# Patient Record
Sex: FEMALE | Race: WHITE | NOT HISPANIC OR LATINO | Employment: UNEMPLOYED | URBAN - METROPOLITAN AREA
[De-identification: names, ages, dates, MRNs, and addresses within clinical notes are randomized per-mention and may not be internally consistent; named-entity substitution may affect disease eponyms.]

---

## 2023-09-09 ENCOUNTER — HOSPITAL ENCOUNTER (OUTPATIENT)
Dept: DATA CONVERSION | Facility: HOSPITAL | Age: 43
Discharge: HOME | End: 2023-09-09
Payer: COMMERCIAL

## 2023-09-09 DIAGNOSIS — F17.200 NICOTINE DEPENDENCE, UNSPECIFIED, UNCOMPLICATED: ICD-10-CM

## 2023-09-09 DIAGNOSIS — Z88.2 ALLERGY STATUS TO SULFONAMIDES: ICD-10-CM

## 2023-09-09 DIAGNOSIS — R20.2 PARESTHESIA OF SKIN: ICD-10-CM

## 2023-09-09 DIAGNOSIS — R20.0 ANESTHESIA OF SKIN: ICD-10-CM

## 2023-09-26 ENCOUNTER — HOSPITAL ENCOUNTER (OUTPATIENT)
Dept: DATA CONVERSION | Facility: HOSPITAL | Age: 43
Discharge: HOME | End: 2023-09-26
Payer: COMMERCIAL

## 2023-09-26 DIAGNOSIS — F17.200 NICOTINE DEPENDENCE, UNSPECIFIED, UNCOMPLICATED: ICD-10-CM

## 2023-09-26 DIAGNOSIS — G56.01 CARPAL TUNNEL SYNDROME, RIGHT UPPER LIMB: ICD-10-CM

## 2023-09-26 DIAGNOSIS — M79.644 PAIN IN RIGHT FINGER(S): ICD-10-CM

## 2023-09-26 DIAGNOSIS — Z88.2 ALLERGY STATUS TO SULFONAMIDES: ICD-10-CM

## 2023-12-05 ENCOUNTER — APPOINTMENT (OUTPATIENT)
Dept: RADIOLOGY | Facility: HOSPITAL | Age: 43
End: 2023-12-05
Payer: COMMERCIAL

## 2023-12-05 ENCOUNTER — APPOINTMENT (OUTPATIENT)
Dept: CARDIOLOGY | Facility: HOSPITAL | Age: 43
End: 2023-12-05
Payer: COMMERCIAL

## 2023-12-05 ENCOUNTER — HOSPITAL ENCOUNTER (EMERGENCY)
Facility: HOSPITAL | Age: 43
Discharge: HOME | End: 2023-12-05
Attending: EMERGENCY MEDICINE
Payer: COMMERCIAL

## 2023-12-05 VITALS
WEIGHT: 194.1 LBS | BODY MASS INDEX: 35.72 KG/M2 | SYSTOLIC BLOOD PRESSURE: 104 MMHG | HEIGHT: 62 IN | HEART RATE: 97 BPM | OXYGEN SATURATION: 97 % | TEMPERATURE: 97.7 F | DIASTOLIC BLOOD PRESSURE: 71 MMHG | RESPIRATION RATE: 18 BRPM

## 2023-12-05 DIAGNOSIS — J44.1 CHRONIC OBSTRUCTIVE PULMONARY DISEASE WITH ACUTE EXACERBATION (MULTI): ICD-10-CM

## 2023-12-05 DIAGNOSIS — R07.9 CHEST PAIN, UNSPECIFIED TYPE: Primary | ICD-10-CM

## 2023-12-05 DIAGNOSIS — I10 DIASTOLIC HYPERTENSION: ICD-10-CM

## 2023-12-05 LAB
ALBUMIN SERPL-MCNC: 3.7 G/DL (ref 3.5–5)
ALP BLD-CCNC: 52 U/L (ref 35–125)
ALT SERPL-CCNC: 16 U/L (ref 5–40)
ANION GAP SERPL CALC-SCNC: 11 MMOL/L
AST SERPL-CCNC: 15 U/L (ref 5–40)
ATRIAL RATE: 105 BPM
BASOPHILS # BLD AUTO: 0.06 X10*3/UL (ref 0–0.1)
BASOPHILS NFR BLD AUTO: 0.7 %
BILIRUB SERPL-MCNC: 0.2 MG/DL (ref 0.1–1.2)
BUN SERPL-MCNC: 17 MG/DL (ref 8–25)
CALCIUM SERPL-MCNC: 9.3 MG/DL (ref 8.5–10.4)
CHLORIDE SERPL-SCNC: 105 MMOL/L (ref 97–107)
CO2 SERPL-SCNC: 23 MMOL/L (ref 24–31)
CREAT SERPL-MCNC: 0.7 MG/DL (ref 0.4–1.6)
EOSINOPHIL # BLD AUTO: 0.43 X10*3/UL (ref 0–0.7)
EOSINOPHIL NFR BLD AUTO: 4.8 %
ERYTHROCYTE [DISTWIDTH] IN BLOOD BY AUTOMATED COUNT: 13.2 % (ref 11.5–14.5)
FLUAV RNA RESP QL NAA+PROBE: NOT DETECTED
FLUBV RNA RESP QL NAA+PROBE: NOT DETECTED
GFR SERPL CREATININE-BSD FRML MDRD: >90 ML/MIN/1.73M*2
GLUCOSE SERPL-MCNC: 131 MG/DL (ref 65–99)
HCG SERPL-ACNC: <1 MIU/ML
HCT VFR BLD AUTO: 45.3 % (ref 36–46)
HGB BLD-MCNC: 14.9 G/DL (ref 12–16)
IMM GRANULOCYTES # BLD AUTO: 0.06 X10*3/UL (ref 0–0.7)
IMM GRANULOCYTES NFR BLD AUTO: 0.7 % (ref 0–0.9)
LYMPHOCYTES # BLD AUTO: 2.92 X10*3/UL (ref 1.2–4.8)
LYMPHOCYTES NFR BLD AUTO: 32.4 %
MCH RBC QN AUTO: 27.7 PG (ref 26–34)
MCHC RBC AUTO-ENTMCNC: 32.9 G/DL (ref 32–36)
MCV RBC AUTO: 84 FL (ref 80–100)
MONOCYTES # BLD AUTO: 0.49 X10*3/UL (ref 0.1–1)
MONOCYTES NFR BLD AUTO: 5.4 %
NEUTROPHILS # BLD AUTO: 5.05 X10*3/UL (ref 1.2–7.7)
NEUTROPHILS NFR BLD AUTO: 56 %
NRBC BLD-RTO: 0 /100 WBCS (ref 0–0)
NT-PROBNP SERPL-MCNC: <36 PG/ML (ref 0–178)
P AXIS: 79 DEGREES
P OFFSET: 198 MS
P ONSET: 154 MS
PLATELET # BLD AUTO: 262 X10*3/UL (ref 150–450)
POTASSIUM SERPL-SCNC: 4.2 MMOL/L (ref 3.4–5.1)
PR INTERVAL: 134 MS
PROT SERPL-MCNC: 6.7 G/DL (ref 5.9–7.9)
Q ONSET: 221 MS
QRS COUNT: 17 BEATS
QRS DURATION: 76 MS
QT INTERVAL: 348 MS
QTC CALCULATION(BAZETT): 459 MS
QTC FREDERICIA: 419 MS
R AXIS: 74 DEGREES
RBC # BLD AUTO: 5.37 X10*6/UL (ref 4–5.2)
RBC MORPH BLD: NORMAL
SARS-COV-2 RNA RESP QL NAA+PROBE: NOT DETECTED
SODIUM SERPL-SCNC: 139 MMOL/L (ref 133–145)
T AXIS: 69 DEGREES
T OFFSET: 395 MS
TROPONIN T SERPL-MCNC: <6 NG/L
TROPONIN T SERPL-MCNC: <6 NG/L
VENTRICULAR RATE: 105 BPM
WBC # BLD AUTO: 9 X10*3/UL (ref 4.4–11.3)

## 2023-12-05 PROCEDURE — 96375 TX/PRO/DX INJ NEW DRUG ADDON: CPT

## 2023-12-05 PROCEDURE — 2500000004 HC RX 250 GENERAL PHARMACY W/ HCPCS (ALT 636 FOR OP/ED): Performed by: EMERGENCY MEDICINE

## 2023-12-05 PROCEDURE — 96374 THER/PROPH/DIAG INJ IV PUSH: CPT

## 2023-12-05 PROCEDURE — 96361 HYDRATE IV INFUSION ADD-ON: CPT

## 2023-12-05 PROCEDURE — 84702 CHORIONIC GONADOTROPIN TEST: CPT | Performed by: EMERGENCY MEDICINE

## 2023-12-05 PROCEDURE — 84484 ASSAY OF TROPONIN QUANT: CPT | Performed by: EMERGENCY MEDICINE

## 2023-12-05 PROCEDURE — 99284 EMERGENCY DEPT VISIT MOD MDM: CPT | Mod: 25 | Performed by: EMERGENCY MEDICINE

## 2023-12-05 PROCEDURE — 87636 SARSCOV2 & INF A&B AMP PRB: CPT | Performed by: EMERGENCY MEDICINE

## 2023-12-05 PROCEDURE — 80053 COMPREHEN METABOLIC PANEL: CPT | Performed by: EMERGENCY MEDICINE

## 2023-12-05 PROCEDURE — 2500000002 HC RX 250 W HCPCS SELF ADMINISTERED DRUGS (ALT 637 FOR MEDICARE OP, ALT 636 FOR OP/ED): Performed by: EMERGENCY MEDICINE

## 2023-12-05 PROCEDURE — 85025 COMPLETE CBC W/AUTO DIFF WBC: CPT | Performed by: EMERGENCY MEDICINE

## 2023-12-05 PROCEDURE — 71045 X-RAY EXAM CHEST 1 VIEW: CPT

## 2023-12-05 PROCEDURE — 83880 ASSAY OF NATRIURETIC PEPTIDE: CPT | Performed by: EMERGENCY MEDICINE

## 2023-12-05 PROCEDURE — 93005 ELECTROCARDIOGRAM TRACING: CPT

## 2023-12-05 PROCEDURE — 36415 COLL VENOUS BLD VENIPUNCTURE: CPT | Performed by: EMERGENCY MEDICINE

## 2023-12-05 RX ORDER — ALBUTEROL SULFATE 90 UG/1
2 AEROSOL, METERED RESPIRATORY (INHALATION) EVERY 4 HOURS PRN
Qty: 18 G | Refills: 0 | Status: SHIPPED | OUTPATIENT
Start: 2023-12-05 | End: 2024-01-04

## 2023-12-05 RX ORDER — ALBUTEROL SULFATE 90 UG/1
2 AEROSOL, METERED RESPIRATORY (INHALATION) EVERY 4 HOURS PRN
COMMUNITY
End: 2023-12-05

## 2023-12-05 RX ORDER — IPRATROPIUM BROMIDE AND ALBUTEROL SULFATE 2.5; .5 MG/3ML; MG/3ML
3 SOLUTION RESPIRATORY (INHALATION)
Status: DISCONTINUED | OUTPATIENT
Start: 2023-12-05 | End: 2023-12-05

## 2023-12-05 RX ORDER — FAMOTIDINE 10 MG/ML
20 INJECTION INTRAVENOUS ONCE
Status: COMPLETED | OUTPATIENT
Start: 2023-12-05 | End: 2023-12-05

## 2023-12-05 RX ORDER — KETOROLAC TROMETHAMINE 30 MG/ML
15 INJECTION, SOLUTION INTRAMUSCULAR; INTRAVENOUS ONCE
Status: COMPLETED | OUTPATIENT
Start: 2023-12-05 | End: 2023-12-05

## 2023-12-05 RX ORDER — METHYLPREDNISOLONE 4 MG/1
TABLET ORAL
Qty: 21 TABLET | Refills: 0 | Status: SHIPPED | OUTPATIENT
Start: 2023-12-05 | End: 2023-12-12

## 2023-12-05 RX ORDER — IPRATROPIUM BROMIDE AND ALBUTEROL SULFATE 2.5; .5 MG/3ML; MG/3ML
3 SOLUTION RESPIRATORY (INHALATION)
Status: DISCONTINUED | OUTPATIENT
Start: 2023-12-05 | End: 2023-12-05 | Stop reason: HOSPADM

## 2023-12-05 RX ORDER — DICLOFENAC SODIUM 30 MG/G
GEL TOPICAL
COMMUNITY

## 2023-12-05 RX ADMIN — FAMOTIDINE 20 MG: 10 INJECTION, SOLUTION INTRAVENOUS at 08:41

## 2023-12-05 RX ADMIN — KETOROLAC TROMETHAMINE 15 MG: 30 INJECTION, SOLUTION INTRAMUSCULAR at 08:42

## 2023-12-05 RX ADMIN — METHYLPREDNISOLONE SODIUM SUCCINATE 125 MG: 125 INJECTION, POWDER, FOR SOLUTION INTRAMUSCULAR; INTRAVENOUS at 08:42

## 2023-12-05 RX ADMIN — IPRATROPIUM BROMIDE AND ALBUTEROL SULFATE 3 ML: 2.5; .5 SOLUTION RESPIRATORY (INHALATION) at 08:42

## 2023-12-05 RX ADMIN — SODIUM CHLORIDE 1000 ML: 900 INJECTION, SOLUTION INTRAVENOUS at 08:42

## 2023-12-05 ASSESSMENT — COLUMBIA-SUICIDE SEVERITY RATING SCALE - C-SSRS
1. IN THE PAST MONTH, HAVE YOU WISHED YOU WERE DEAD OR WISHED YOU COULD GO TO SLEEP AND NOT WAKE UP?: NO
6. HAVE YOU EVER DONE ANYTHING, STARTED TO DO ANYTHING, OR PREPARED TO DO ANYTHING TO END YOUR LIFE?: NO
2. HAVE YOU ACTUALLY HAD ANY THOUGHTS OF KILLING YOURSELF?: NO

## 2023-12-05 ASSESSMENT — PAIN DESCRIPTION - LOCATION: LOCATION: CHEST

## 2023-12-05 ASSESSMENT — PAIN SCALES - GENERAL: PAINLEVEL_OUTOF10: 3

## 2023-12-05 ASSESSMENT — PAIN - FUNCTIONAL ASSESSMENT: PAIN_FUNCTIONAL_ASSESSMENT: 0-10

## 2023-12-05 NOTE — Clinical Note
Radha Ellsworth was seen and treated in our emergency department on 12/5/2023.  She may return to work on 12/06/2023.       If you have any questions or concerns, please don't hesitate to call.      Devora Arteaga MD

## 2023-12-05 NOTE — ED PROVIDER NOTES
HPI   No chief complaint on file.      HPI                    No data recorded                Patient History   Past Medical History:   Diagnosis Date    Unspecified asthma, uncomplicated 10/23/2017    Asthma     Past Surgical History:   Procedure Laterality Date     SECTION, CLASSIC  2014     Section    HAND SURGERY  2014    Hand Surgery                                                                                                                                                           No family history on file.  Social History     Tobacco Use    Smoking status: Not on file    Smokeless tobacco: Not on file   Substance Use Topics    Alcohol use: Not on file    Drug use: Not on file       Physical Exam   ED Triage Vitals   Temp Pulse Resp BP   -- -- -- --      SpO2 Temp src Heart Rate Source Patient Position   -- -- -- --      BP Location FiO2 (%)     -- --       Physical Exam    ED Course & MDM   Diagnoses as of 23 1054   Chest pain, unspecified type   Chronic obstructive pulmonary disease with acute exacerbation (CMS/HCC)   Diastolic hypertension       Medical Decision Making    The patient is a 42-year-old female presenting to the emergency department for evaluation of substernal chest pain, shortness of breath and nonproductive cough with generalized malaise and fatigue.  The patient reportedly has been having symptoms over the past several days.  She states that the chest pain started about 3 hours prior to arrival.  It is substernal.  It is a pressure.  No better or worse.  No radiation.  No fever or chills.  She states that she has been around other people ill with upper respiratory infection.  No recent travel.  No headache or visual changes.  No abdominal pain.  No nausea or vomiting.  No diarrhea or constipation but no urinary complaints.  She does smoke.  She does have a history of asthma and COPD.  She does not use supplemental oxygen at home.  She denies any history  of CAD or ACS.  No history of PE or DVT.  No history of hypertension, hyperlipidemia or diabetes.  She does have a history of carpal tunnel syndrome.  All pertinent positives and negatives are recorded above.  All other systems reviewed and otherwise negative.  Vital signs within normal limits.  Physical exam with a well-nourished well-developed female in no acute distress.  HEENT exam within normal limits.  She does have scant diffuse wheezing on lung exam.  She is able to converse without difficulty.  She has no gross motor, neurologic or vascular deficits on exam.  Abdominal exam is benign.      EKG with sinus tachycardia at 105 bpm, normal axis, normal voltage, normal ST segment, normal T waves      IV fluids, IV Toradol and IV Pepcid ordered.  DuoNebs and IV Solu-Medrol also ordered.      Diagnostic labs with mild electrolyte imbalance but otherwise unremarkable.      Initial Troponin T < 6.  Repeat trop < 6      COVID-19 testing negative      XR chest 1 view   Final Result   No acute cardiopulmonary disease.        Signed by: Valdo Casey 12/5/2023 8:44 AM   Dictation workstation:   [id number]           No evidence of ischemia on EKG or cardiac enzymes.  No events on telemetry.  The patient did have wheezing when she arrived to the emergency room but this did improve with treatment with DuoNebs and IV Solu-Medrol.  She did not have any significant lab abnormality.  Chest x-ray without acute process.  No evidence of pneumonia or pneumothorax.  No evidence of CHF.  Suspect asthma/COPD exacerbation as a likely etiology of her symptoms.      Trial of ambulation with a pulse ox of greater than 95% at all times      The patient was released in good condition.  She was instructed to follow-up with her primary care physician within 1 to 2 days for further management of her current symptoms and repeat check of her blood pressure.  She will return to the emergency department sooner with worsening of symptoms or  onset of new symptoms.  Rx given for Medrol Dosepak and albuterol inhaler.      Impression/diagnosis  Chest pain, substernal  Asthma/COPD exacerbation  3.  Diastolic hypertension      I reviewed the results of the diagnostic labs and diagnostic imaging.  Formal radiology reading was completed by the radiologist      Procedure  Procedures     Devora Arteaga MD  12/05/23 [id number]

## 2023-12-05 NOTE — DISCHARGE INSTRUCTIONS
Follow-up with your primary care physician within 1 to 2 days for further management of your current symptoms and repeat check of your blood pressure.      Return to the emergency department sooner with worsening of symptoms or onset of new symptoms

## 2023-12-26 ENCOUNTER — HOSPITAL ENCOUNTER (EMERGENCY)
Facility: HOSPITAL | Age: 43
Discharge: LEFT BEFORE DISCHARGE | End: 2023-12-26
Attending: STUDENT IN AN ORGANIZED HEALTH CARE EDUCATION/TRAINING PROGRAM
Payer: COMMERCIAL

## 2023-12-26 ENCOUNTER — APPOINTMENT (OUTPATIENT)
Dept: CARDIOLOGY | Facility: HOSPITAL | Age: 43
End: 2023-12-26
Payer: COMMERCIAL

## 2023-12-26 VITALS
OXYGEN SATURATION: 97 % | RESPIRATION RATE: 24 BRPM | HEART RATE: 121 BPM | TEMPERATURE: 98.6 F | BODY MASS INDEX: 40.25 KG/M2 | WEIGHT: 218.7 LBS | DIASTOLIC BLOOD PRESSURE: 74 MMHG | SYSTOLIC BLOOD PRESSURE: 147 MMHG | HEIGHT: 62 IN

## 2023-12-26 DIAGNOSIS — F41.9 ANXIETY: ICD-10-CM

## 2023-12-26 DIAGNOSIS — M25.511 ACUTE PAIN OF RIGHT SHOULDER: Primary | ICD-10-CM

## 2023-12-26 PROCEDURE — 99283 EMERGENCY DEPT VISIT LOW MDM: CPT | Performed by: STUDENT IN AN ORGANIZED HEALTH CARE EDUCATION/TRAINING PROGRAM

## 2023-12-26 PROCEDURE — 93005 ELECTROCARDIOGRAM TRACING: CPT

## 2023-12-26 RX ORDER — LORAZEPAM 2 MG/ML
1 INJECTION INTRAMUSCULAR ONCE
Status: DISCONTINUED | OUTPATIENT
Start: 2023-12-26 | End: 2023-12-26 | Stop reason: HOSPADM

## 2023-12-26 ASSESSMENT — COLUMBIA-SUICIDE SEVERITY RATING SCALE - C-SSRS
6. HAVE YOU EVER DONE ANYTHING, STARTED TO DO ANYTHING, OR PREPARED TO DO ANYTHING TO END YOUR LIFE?: NO
2. HAVE YOU ACTUALLY HAD ANY THOUGHTS OF KILLING YOURSELF?: NO
1. IN THE PAST MONTH, HAVE YOU WISHED YOU WERE DEAD OR WISHED YOU COULD GO TO SLEEP AND NOT WAKE UP?: NO

## 2023-12-26 ASSESSMENT — PAIN SCALES - GENERAL: PAINLEVEL_OUTOF10: 10 - WORST POSSIBLE PAIN

## 2023-12-26 ASSESSMENT — PAIN - FUNCTIONAL ASSESSMENT: PAIN_FUNCTIONAL_ASSESSMENT: 0-10

## 2023-12-26 NOTE — ED PROVIDER NOTES
HPI   Chief Complaint   Patient presents with    Anxiety     PT presents to the ED with c/c of anxiety.        42-year-old female presents with chest pain.  Patient was pulled over by [employer] for driving erratically.  When there was concern for possibly being under the influence, she was told she was going to be under arrest, during which time she developed severe chest pain.  EMS was called for evaluation, prompting ED evaluation for medical clearance.  Patient currently complains of chest pain, described as a squeezing pain, radiating into her left arm.  Admits to shortness of breath.  Patient very tearful and anxious, believes she may be having a panic attack.  Does not admit to any substance use.                          No data recorded                Patient History   Past Medical History:   Diagnosis Date    Asperger's syndrome     Unspecified asthma, uncomplicated 10/23/2017    Asthma     Past Surgical History:   Procedure Laterality Date     SECTION, CLASSIC  2014     Section    HAND SURGERY  2014    Hand Surgery                                                                                                                                                           No family history on file.  Social History     Tobacco Use    Smoking status: Some Days     Types: Cigarettes    Smokeless tobacco: Never   Substance Use Topics    Alcohol use: Not on file    Drug use: Not on file       Physical Exam   ED Triage Vitals [23 0508]   Temp Heart Rate Resp BP   37 °C (98.6 °F) (!) 121 24 147/74      SpO2 Temp Source Heart Rate Source Patient Position   97 % Oral Monitor Sitting      BP Location FiO2 (%)     Left arm --       Physical Exam  Vitals and nursing note reviewed.   Constitutional:       General: She is not in acute distress.     Appearance: She is not ill-appearing.   HENT:      Head: Normocephalic and atraumatic.      Mouth/Throat:      Mouth: Mucous membranes are  moist.      Pharynx: Oropharynx is clear.   Eyes:      Extraocular Movements: Extraocular movements intact.      Conjunctiva/sclera: Conjunctivae normal.      Pupils: Pupils are equal, round, and reactive to light.   Cardiovascular:      Rate and Rhythm: Regular rhythm. Tachycardia present.   Pulmonary:      Effort: Pulmonary effort is normal. Tachypnea present. No accessory muscle usage or respiratory distress.      Breath sounds: Normal breath sounds.   Abdominal:      General: There is no distension.      Palpations: Abdomen is soft.      Tenderness: There is no abdominal tenderness.   Musculoskeletal:         General: No swelling or deformity. Normal range of motion.      Cervical back: Normal range of motion and neck supple.   Skin:     General: Skin is warm and dry.      Capillary Refill: Capillary refill takes less than 2 seconds.   Neurological:      General: No focal deficit present.      Mental Status: She is alert and oriented to person, place, and time. Mental status is at baseline.   Psychiatric:         Mood and Affect: Mood is anxious. Affect is tearful.         Speech: Speech is rapid and pressured.         Behavior: Behavior normal.         ED Course & MDM   Diagnoses as of 12/26/23 0635   Acute pain of right shoulder   Anxiety       Medical Decision Making  42 y.o. female who presents to the ED with chest pain.  Considered wide ddx for pt's presentation, including aortic dissection, pneumothorax, pneumonia, pleurisy, COPD/asthma exacerbation, pleural effusion, CHF, pericarditis, myocarditis, endocarditis, pericardial effusion/tamponade, musculoskeletal chest pain, peptic ulcer disease,  esophageal spasm, esophageal rupture, esophagitis/gastritis, biliary tract disease (eg cholelithiasis), herpes zoster, and anxiety.     HEART SCORE: 0    At the end of my shift, patient is pending EKG and laboratory studies for further evaluation of her chest pain.  Patient is noted to be very anxious with rapid  speech.  Ativan ordered.  Patient care turned over to oncoming rider who will follow-up labs and EKG.    ADDENDUM: Prior to leaving, patient has elected to leave AGAINST MEDICAL ADVICE.  Patient states that she feels uncomfortable with the nursing staff currently present and would like to leave this hospital to be reevaluated at a different hospital.  Patient continues with rapid speech but is alert and oriented x 3, able to voiced understanding towards risks and benefits of leaving against medical vice at this time.  Patient is ambulatory with a steady gait.  Patient has a safe ride in the lobby and plans to go straight to a second ER.          Procedure  Procedures     Jacqueline Darling MD  12/26/23 0622       Jacqueline Darling MD  12/26/23 0634       Jacqueline Darling MD  12/26/23 0635

## 2023-12-26 NOTE — ED NOTES
Urine collected for [facility] testing. Patient consented and agreed to urine testing. Walked patient to the restroom, obtained urine sample with provided kit from officer. This RN stayed in bathroom with patient at all times. Urine sample walked directly to officer Pierce with [facility] [address].      Deirdre Rutherford RN  12/26/23 [id number]

## 2023-12-26 NOTE — ED NOTES
Patient began yelling at staff saying that she wants to go to another hospital because she does not get good vibes from [facility]. After told multiple times what the plan of care is. Patient is still confused and asking the same questions over and over. Patient is not able to understand simple command nor is she able to understand the plan of care. Patient is not willing to do the EKG at this time either and refused blood work. Provider at bedside and tried to reiterate to patient what was going on and she began yelling at the provider. Security had to be present for the patient to exit the building. Patient left with her alert and oriented son who is her sober .      Rebeca Ching RN  12/26/23 6635

## 2024-02-06 ENCOUNTER — APPOINTMENT (OUTPATIENT)
Dept: CARDIOLOGY | Facility: HOSPITAL | Age: 44
End: 2024-02-06
Payer: COMMERCIAL

## 2024-02-06 ENCOUNTER — APPOINTMENT (OUTPATIENT)
Dept: RADIOLOGY | Facility: HOSPITAL | Age: 44
End: 2024-02-06
Payer: COMMERCIAL

## 2024-02-06 ENCOUNTER — HOSPITAL ENCOUNTER (EMERGENCY)
Facility: HOSPITAL | Age: 44
Discharge: HOME | End: 2024-02-06
Attending: STUDENT IN AN ORGANIZED HEALTH CARE EDUCATION/TRAINING PROGRAM
Payer: COMMERCIAL

## 2024-02-06 VITALS
DIASTOLIC BLOOD PRESSURE: 93 MMHG | HEIGHT: 65 IN | RESPIRATION RATE: 18 BRPM | TEMPERATURE: 97.7 F | HEART RATE: 94 BPM | WEIGHT: 218.7 LBS | SYSTOLIC BLOOD PRESSURE: 123 MMHG | BODY MASS INDEX: 36.44 KG/M2 | OXYGEN SATURATION: 98 %

## 2024-02-06 DIAGNOSIS — N94.89 ADNEXAL MASS: Primary | ICD-10-CM

## 2024-02-06 DIAGNOSIS — R11.2 NAUSEA AND VOMITING, UNSPECIFIED VOMITING TYPE: ICD-10-CM

## 2024-02-06 DIAGNOSIS — D72.829 LEUKOCYTOSIS, UNSPECIFIED TYPE: ICD-10-CM

## 2024-02-06 DIAGNOSIS — J45.909 ASTHMA, UNSPECIFIED ASTHMA SEVERITY, UNSPECIFIED WHETHER COMPLICATED, UNSPECIFIED WHETHER PERSISTENT (HHS-HCC): ICD-10-CM

## 2024-02-06 LAB
ALBUMIN SERPL-MCNC: 4.2 G/DL (ref 3.5–5)
ALP BLD-CCNC: 57 U/L (ref 35–125)
ALT SERPL-CCNC: 14 U/L (ref 5–40)
ANION GAP SERPL CALC-SCNC: 12 MMOL/L
APPEARANCE UR: CLEAR
AST SERPL-CCNC: 12 U/L (ref 5–40)
BASOPHILS # BLD AUTO: 0.06 X10*3/UL (ref 0–0.1)
BASOPHILS # BLD AUTO: 0.06 X10*3/UL (ref 0–0.1)
BASOPHILS NFR BLD AUTO: 0.3 %
BASOPHILS NFR BLD AUTO: 0.4 %
BILIRUB SERPL-MCNC: 0.3 MG/DL (ref 0.1–1.2)
BILIRUB UR STRIP.AUTO-MCNC: NEGATIVE MG/DL
BUN SERPL-MCNC: 16 MG/DL (ref 8–25)
CALCIUM SERPL-MCNC: 9.8 MG/DL (ref 8.5–10.4)
CHLORIDE SERPL-SCNC: 101 MMOL/L (ref 97–107)
CO2 SERPL-SCNC: 23 MMOL/L (ref 24–31)
COLOR UR: NORMAL
CREAT SERPL-MCNC: 0.8 MG/DL (ref 0.4–1.6)
EGFRCR SERPLBLD CKD-EPI 2021: >90 ML/MIN/1.73M*2
EOSINOPHIL # BLD AUTO: 0.38 X10*3/UL (ref 0–0.7)
EOSINOPHIL # BLD AUTO: 0.43 X10*3/UL (ref 0–0.7)
EOSINOPHIL NFR BLD AUTO: 2.3 %
EOSINOPHIL NFR BLD AUTO: 2.4 %
ERYTHROCYTE [DISTWIDTH] IN BLOOD BY AUTOMATED COUNT: 13.6 % (ref 11.5–14.5)
ERYTHROCYTE [DISTWIDTH] IN BLOOD BY AUTOMATED COUNT: 13.7 % (ref 11.5–14.5)
FLUAV RNA RESP QL NAA+PROBE: NOT DETECTED
FLUBV RNA RESP QL NAA+PROBE: NOT DETECTED
GLUCOSE SERPL-MCNC: 111 MG/DL (ref 65–99)
GLUCOSE UR STRIP.AUTO-MCNC: NORMAL MG/DL
HCG SERPL-ACNC: <1 MIU/ML
HCT VFR BLD AUTO: 46.2 % (ref 36–46)
HCT VFR BLD AUTO: 51.1 % (ref 36–46)
HGB BLD-MCNC: 15 G/DL (ref 12–16)
HGB BLD-MCNC: 16.7 G/DL (ref 12–16)
IMM GRANULOCYTES # BLD AUTO: 0.12 X10*3/UL (ref 0–0.7)
IMM GRANULOCYTES # BLD AUTO: 0.13 X10*3/UL (ref 0–0.7)
IMM GRANULOCYTES NFR BLD AUTO: 0.7 % (ref 0–0.9)
IMM GRANULOCYTES NFR BLD AUTO: 0.7 % (ref 0–0.9)
KETONES UR STRIP.AUTO-MCNC: NEGATIVE MG/DL
LEUKOCYTE ESTERASE UR QL STRIP.AUTO: NEGATIVE
LIPASE SERPL-CCNC: 27 U/L (ref 16–63)
LYMPHOCYTES # BLD AUTO: 3 X10*3/UL (ref 1.2–4.8)
LYMPHOCYTES # BLD AUTO: 3.86 X10*3/UL (ref 1.2–4.8)
LYMPHOCYTES NFR BLD AUTO: 16 %
LYMPHOCYTES NFR BLD AUTO: 24 %
MAGNESIUM SERPL-MCNC: 1.9 MG/DL (ref 1.6–3.1)
MCH RBC QN AUTO: 27.2 PG (ref 26–34)
MCH RBC QN AUTO: 27.2 PG (ref 26–34)
MCHC RBC AUTO-ENTMCNC: 32.5 G/DL (ref 32–36)
MCHC RBC AUTO-ENTMCNC: 32.7 G/DL (ref 32–36)
MCV RBC AUTO: 83 FL (ref 80–100)
MCV RBC AUTO: 84 FL (ref 80–100)
MONOCYTES # BLD AUTO: 0.8 X10*3/UL (ref 0.1–1)
MONOCYTES # BLD AUTO: 0.96 X10*3/UL (ref 0.1–1)
MONOCYTES NFR BLD AUTO: 5 %
MONOCYTES NFR BLD AUTO: 5.1 %
NEUTROPHILS # BLD AUTO: 10.84 X10*3/UL (ref 1.2–7.7)
NEUTROPHILS # BLD AUTO: 14.17 X10*3/UL (ref 1.2–7.7)
NEUTROPHILS NFR BLD AUTO: 67.5 %
NEUTROPHILS NFR BLD AUTO: 75.6 %
NITRITE UR QL STRIP.AUTO: NEGATIVE
NRBC BLD-RTO: 0 /100 WBCS (ref 0–0)
NRBC BLD-RTO: 0 /100 WBCS (ref 0–0)
PH UR STRIP.AUTO: 5.5 [PH]
PLATELET # BLD AUTO: 291 X10*3/UL (ref 150–450)
PLATELET # BLD AUTO: 368 X10*3/UL (ref 150–450)
POTASSIUM SERPL-SCNC: 4 MMOL/L (ref 3.4–5.1)
PROT SERPL-MCNC: 7.3 G/DL (ref 5.9–7.9)
PROT UR STRIP.AUTO-MCNC: NEGATIVE MG/DL
RBC # BLD AUTO: 5.52 X10*6/UL (ref 4–5.2)
RBC # BLD AUTO: 6.14 X10*6/UL (ref 4–5.2)
RBC # UR STRIP.AUTO: NEGATIVE /UL
SARS-COV-2 RNA RESP QL NAA+PROBE: NOT DETECTED
SODIUM SERPL-SCNC: 136 MMOL/L (ref 133–145)
SP GR UR STRIP.AUTO: 1.02
UROBILINOGEN UR STRIP.AUTO-MCNC: NORMAL MG/DL
WBC # BLD AUTO: 16.1 X10*3/UL (ref 4.4–11.3)
WBC # BLD AUTO: 18.8 X10*3/UL (ref 4.4–11.3)

## 2024-02-06 PROCEDURE — 2500000004 HC RX 250 GENERAL PHARMACY W/ HCPCS (ALT 636 FOR OP/ED): Performed by: PHYSICIAN ASSISTANT

## 2024-02-06 PROCEDURE — 94640 AIRWAY INHALATION TREATMENT: CPT

## 2024-02-06 PROCEDURE — 74177 CT ABD & PELVIS W/CONTRAST: CPT

## 2024-02-06 PROCEDURE — 87636 SARSCOV2 & INF A&B AMP PRB: CPT | Performed by: PHYSICIAN ASSISTANT

## 2024-02-06 PROCEDURE — 96376 TX/PRO/DX INJ SAME DRUG ADON: CPT | Mod: 59

## 2024-02-06 PROCEDURE — 2550000001 HC RX 255 CONTRASTS: Performed by: STUDENT IN AN ORGANIZED HEALTH CARE EDUCATION/TRAINING PROGRAM

## 2024-02-06 PROCEDURE — 96374 THER/PROPH/DIAG INJ IV PUSH: CPT | Mod: 59

## 2024-02-06 PROCEDURE — 36415 COLL VENOUS BLD VENIPUNCTURE: CPT | Performed by: PHYSICIAN ASSISTANT

## 2024-02-06 PROCEDURE — 80053 COMPREHEN METABOLIC PANEL: CPT | Performed by: PHYSICIAN ASSISTANT

## 2024-02-06 PROCEDURE — 2500000002 HC RX 250 W HCPCS SELF ADMINISTERED DRUGS (ALT 637 FOR MEDICARE OP, ALT 636 FOR OP/ED): Performed by: PHYSICIAN ASSISTANT

## 2024-02-06 PROCEDURE — 99284 EMERGENCY DEPT VISIT MOD MDM: CPT | Mod: 25 | Performed by: STUDENT IN AN ORGANIZED HEALTH CARE EDUCATION/TRAINING PROGRAM

## 2024-02-06 PROCEDURE — 83690 ASSAY OF LIPASE: CPT | Performed by: PHYSICIAN ASSISTANT

## 2024-02-06 PROCEDURE — 93005 ELECTROCARDIOGRAM TRACING: CPT

## 2024-02-06 PROCEDURE — 83735 ASSAY OF MAGNESIUM: CPT | Performed by: PHYSICIAN ASSISTANT

## 2024-02-06 PROCEDURE — 2500000001 HC RX 250 WO HCPCS SELF ADMINISTERED DRUGS (ALT 637 FOR MEDICARE OP): Performed by: STUDENT IN AN ORGANIZED HEALTH CARE EDUCATION/TRAINING PROGRAM

## 2024-02-06 PROCEDURE — 96375 TX/PRO/DX INJ NEW DRUG ADDON: CPT | Mod: 59

## 2024-02-06 PROCEDURE — 96361 HYDRATE IV INFUSION ADD-ON: CPT | Mod: 59

## 2024-02-06 PROCEDURE — 84702 CHORIONIC GONADOTROPIN TEST: CPT | Performed by: PHYSICIAN ASSISTANT

## 2024-02-06 PROCEDURE — 85025 COMPLETE CBC W/AUTO DIFF WBC: CPT | Performed by: PHYSICIAN ASSISTANT

## 2024-02-06 PROCEDURE — 81003 URINALYSIS AUTO W/O SCOPE: CPT | Performed by: PHYSICIAN ASSISTANT

## 2024-02-06 PROCEDURE — 71045 X-RAY EXAM CHEST 1 VIEW: CPT

## 2024-02-06 RX ORDER — ALBUTEROL SULFATE 90 UG/1
2 AEROSOL, METERED RESPIRATORY (INHALATION) EVERY 4 HOURS PRN
Qty: 18 G | Refills: 0 | Status: SHIPPED | OUTPATIENT
Start: 2024-02-06 | End: 2024-03-07

## 2024-02-06 RX ORDER — ONDANSETRON 4 MG/1
4 TABLET, ORALLY DISINTEGRATING ORAL EVERY 8 HOURS PRN
Qty: 15 TABLET | Refills: 0 | Status: SHIPPED | OUTPATIENT
Start: 2024-02-06 | End: 2024-02-13

## 2024-02-06 RX ORDER — IPRATROPIUM BROMIDE AND ALBUTEROL SULFATE 2.5; .5 MG/3ML; MG/3ML
3 SOLUTION RESPIRATORY (INHALATION) ONCE
Status: COMPLETED | OUTPATIENT
Start: 2024-02-06 | End: 2024-02-06

## 2024-02-06 RX ORDER — LORAZEPAM 0.5 MG/1
0.5 TABLET ORAL ONCE
Status: COMPLETED | OUTPATIENT
Start: 2024-02-06 | End: 2024-02-06

## 2024-02-06 RX ORDER — LORAZEPAM 2 MG/ML
0.5 INJECTION INTRAMUSCULAR ONCE
Status: DISCONTINUED | OUTPATIENT
Start: 2024-02-06 | End: 2024-02-06

## 2024-02-06 RX ORDER — ONDANSETRON HYDROCHLORIDE 2 MG/ML
4 INJECTION, SOLUTION INTRAVENOUS ONCE
Status: COMPLETED | OUTPATIENT
Start: 2024-02-06 | End: 2024-02-06

## 2024-02-06 RX ORDER — DEXAMETHASONE SODIUM PHOSPHATE 100 MG/10ML
10 INJECTION INTRAMUSCULAR; INTRAVENOUS ONCE
Status: COMPLETED | OUTPATIENT
Start: 2024-02-06 | End: 2024-02-06

## 2024-02-06 RX ORDER — FAMOTIDINE 20 MG/1
20 TABLET, FILM COATED ORAL 2 TIMES DAILY
Qty: 30 TABLET | Refills: 0 | Status: SHIPPED | OUTPATIENT
Start: 2024-02-06 | End: 2024-02-21

## 2024-02-06 RX ADMIN — IOHEXOL 75 ML: 350 INJECTION, SOLUTION INTRAVENOUS at 18:10

## 2024-02-06 RX ADMIN — SODIUM CHLORIDE 1000 ML: 9 INJECTION, SOLUTION INTRAVENOUS at 17:06

## 2024-02-06 RX ADMIN — ONDANSETRON 4 MG: 2 INJECTION INTRAMUSCULAR; INTRAVENOUS at 19:15

## 2024-02-06 RX ADMIN — ONDANSETRON HYDROCHLORIDE 4 MG: 2 INJECTION INTRAMUSCULAR; INTRAVENOUS at 15:20

## 2024-02-06 RX ADMIN — IPRATROPIUM BROMIDE AND ALBUTEROL SULFATE 3 ML: 2.5; .5 SOLUTION RESPIRATORY (INHALATION) at 19:15

## 2024-02-06 RX ADMIN — LORAZEPAM 0.5 MG: 0.5 TABLET ORAL at 16:28

## 2024-02-06 RX ADMIN — SODIUM CHLORIDE 1000 ML: 900 INJECTION, SOLUTION INTRAVENOUS at 15:47

## 2024-02-06 RX ADMIN — DEXAMETHASONE SODIUM PHOSPHATE 10 MG: 10 INJECTION INTRAMUSCULAR; INTRAVENOUS at 19:15

## 2024-02-06 ASSESSMENT — PAIN SCALES - GENERAL: PAINLEVEL_OUTOF10: 0 - NO PAIN

## 2024-02-06 ASSESSMENT — COLUMBIA-SUICIDE SEVERITY RATING SCALE - C-SSRS
6. HAVE YOU EVER DONE ANYTHING, STARTED TO DO ANYTHING, OR PREPARED TO DO ANYTHING TO END YOUR LIFE?: NO
1. IN THE PAST MONTH, HAVE YOU WISHED YOU WERE DEAD OR WISHED YOU COULD GO TO SLEEP AND NOT WAKE UP?: NO
2. HAVE YOU ACTUALLY HAD ANY THOUGHTS OF KILLING YOURSELF?: NO

## 2024-02-06 ASSESSMENT — PAIN - FUNCTIONAL ASSESSMENT: PAIN_FUNCTIONAL_ASSESSMENT: 0-10

## 2024-02-06 NOTE — ED PROVIDER NOTES
Patient was seen by both myself and advanced practitioner.  I personally saw the patient and made/approved the management plan and take responsibility for the patient management     Patient is a 43-year-old female that presents emergency department for evaluation of cough, congestion, nausea and vomiting and diarrhea.  Patient states that this is been going on for the last 2 days.  She initially was seen at urgent care yesterday and was told she may have bronchitis and started on albuterol inhaler as well as Medrol Dosepak.  She states that she started having some watery, nonbloody diarrhea beginning today has had several episodes of nonbloody, nonbilious emesis.  She did get into a verbal argument with her significant other and police were called for domestic dispute.  Patient is currently under arrest.  She does note that she was having an anxiety attack in the officer's vehicle and did have several episodes of emesis.  Patient was brought in for further medical clearance due to the nausea, vomiting and reported shortness of breath.    On exam patient uncomfortable appearing but in no obvious distress.  She is awake, alert and oriented.  She is able to ambulate have normal conversation and does not appear tachypneic and has normal oxygen saturation of 95% on room air.  Abdomen nontender, nondistended however there are hyperactive bowel sounds.  Lungs are clear to auscultation bilaterally.  Blood work ordered including CBC, CMP, magnesium along with testing for COVID-19/influenza.  Chest x-ray and EKG were also ordered at this time.  Patient will given IV fluids, IV Zofran, IV Pepcid as well as a small dose of p.o. Ativan.  Heart rate did significantly improve with IV fluids, IV Pepcid and IV Zofran.  Blood work was remarkable for leukocytosis with a white count of 16 which did increase on repeat testing after IV fluids to 18.  She tested negative for COVID-19 and influenza.  Given the elevated white count CT scan  of the abdomen pelvis was ordered.  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  There is also a 3.2 x 3.2 subcentimeter mass in the left inguinal region however both masses were present on imaging back in October on chart review at an outside facility.  Patient does not have any localized pain in the lower abdomen and repeat abdominal exam is benign.  Did discuss case with gynecologist who reviewed imaging and believes that it is consistent with a dermoid cyst at this time and that patient can follow-up with outpatient.  Patient did have improvement on reevaluation and is felt to be safe for discharge home at this time.  I have very low suspicion that this is a source of patient's pain at this time as history and exam more consistent with a viral infection.  I also have very low suspicion for any abscess in the pelvis given the lack of lower abdominal tenderness palpation and the fact that these were present on prior imaging.  Patient discharged to assisted at this time with prescription for p.o. Zofran, p.o. Pepcid.    I independently interpreted the following study(s) EKG which show sinus tachycardia with a rate of 102, normal axis, no evidence of STEMI.       Blaine Logan,   02/06/24 [id number]

## 2024-02-07 NOTE — DISCHARGE INSTRUCTIONS
Be sure to follow up as directed in 1-2 days.  All of the details of your follow up instructions are detailed in the follow up section of this packet.     You have a large adnexal mass that is believed to be a dermoid however this cannot be confirmed on today's visit, strongly recommend that you follow-up with a gynecologist for further evaluation.  Should you develop new different worsening pains or symptoms a recommend you return back to the emergency department otherwise call make appointment for follow-up    If you are being discharged with any pains medications or muscle relaxers (norco, Vicodin, hydrocodone products, Percocet, oxycodone products, flexeril, cyclobenzaprine, robaxin, norflex, brand or generic, or any other pain controlling medications with the exception of Ibuprofen and regular Tylenol, do not drive or operate machinery, climb ladders or participate in any activity that could potentially put yourself or others at risk should you get dizzy, or be/feel impaired at all.        It is important to remember that your care does not end here and you must continue to monitor your condition closely. Please return to the emergency department for any worsening or concerning signs or symptoms as directed by our conversations and the discharge instructions. Otherwise please follow up with your doctor in 2 days if no better or worse. If you do not have a doctor please contact the referral number on your discharge instructions. Please contact any physician specialists provided in your discharge notes as it is very important to follow up with them regarding your condition. If you are unable to reach the physicians provided, please come back to the Emergency Department at any time.        Return to emergency room without delay for ANY new or worsening pains or for any other symptoms or concerns.

## 2024-02-07 NOTE — ED PROVIDER NOTES
HPI   Chief Complaint   Patient presents with    Shortness of Breath     Pt stated that she is short of breath and cannot breath, she has a hx of COPD.        HPI  43-year-old female here under police custody.  Patient says that she is having little bit of upper abdominal discomfort and some nausea and vomiting, apparently patient says that her symptoms started yesterday, today apparently police were called and upon being arrested she started developing increasing symptoms with nausea and vomiting, she states that she did recently see her doctor who started her on steroids but she has not yet picked them up from the pharmacy.  She was told that she might have bronchitis.  The patient denies any fevers, no diarrhea                  [place] Coma Scale Score: 15                     Patient History   Past Medical History:   Diagnosis Date    Asperger's syndrome     Unspecified asthma, uncomplicated 10/23/2017    Asthma     Past Surgical History:   Procedure Laterality Date     SECTION, CLASSIC  2014     Section    HAND SURGERY  2014    Hand Surgery                                                                                                                                                           No family history on file.  Social History     Tobacco Use    Smoking status: Some Days     Types: Cigarettes    Smokeless tobacco: Never   Substance Use Topics    Alcohol use: Not on file    Drug use: Not on file       Physical Exam   ED Triage Vitals [24 1513]   Temperature Heart Rate Respirations BP   36.5 °C (97.7 °F) (!) 110 15 (!) 138/103      Pulse Ox Temp Source Heart Rate Source Patient Position   95 % Oral Monitor Sitting      BP Location FiO2 (%)     Right arm --       Physical Exam    ED Course & [facility]   ED Course as of 24   1602 EKG Time: 1601  EKG Interpretation time: 1602  EKG Interpretation: EKG shows sinus tachycardia with a rate of 102, normal  axis, QTc 440, no evidence of STEMI.    EKG was interpreted by myself independently [JL]   1721 Patient doing well, blood cell count elevated, will order CT scan for further evaluation [AP]   1918 Spoke to Dr. Rosario, reviewed tests diagnostics and case, she does not feel that an ultrasound is warranted at this time, patient does not have any specific unilateral pain in the lower quadrants, and she states that she believes that the nausea and vomiting is unrelated to the gynecologic findings on imaging.  She recommends outpatient follow-up [AP]      ED Course User Index  [AP] Marvin Millard PA-C  [JL] Blaine Logan, DO         Diagnoses as of 02/06/24 1924   Adnexal mass   Nausea and vomiting, unspecified vomiting type   Leukocytosis, unspecified type       Medical Decision Making  Parts of this chart have been completed using voice recognition software. Please excuse any errors of transcription.  My thought process and reason for plan has been formulated from the time that I saw the patient until the time of disposition and is not specific to one specific moment during their visit and furthermore my MDM encompasses this entire chart and not only this text box.      HPI: Detailed above.    Exam: A medically appropriate exam performed, outlined above, given the known history and presentation.    History Limited by: Nothing    History obtained from: The patient    External/internal records reviewed: Reviewed records including imaging studies from October to compare the adnexal mass    Social Determinants of Health considered during this visit: Currently incarcerated under police custody    Chronic conditions impacting care: Known history of adnexal lesion/mass    Medications given during visit:  Medications   sodium chloride 0.9 % bolus 1,000 mL (0 mL intravenous Stopped 2/6/24 1617)   ondansetron (Zofran) injection 4 mg (4 mg intravenous Given 2/6/24 1520)   LORazepam (Ativan) tablet 0.5 mg (0.5 mg oral  Given 2/6/24 1628)   sodium chloride 0.9 % bolus 1,000 mL (1,000 mL intravenous New Bag 2/6/24 1706)   iohexol (OMNIPaque) 350 mg iodine/mL solution 75 mL (75 mL intravenous Given 2/6/24 1810)   dexAMETHasone (Decadron) injection 10 mg (10 mg intravenous Given 2/6/24 1915)   ipratropium-albuteroL (Duo-Neb) 0.5-2.5 mg/3 mL nebulizer solution 3 mL (3 mL nebulization Given 2/6/24 1915)   ondansetron (Zofran) injection 4 mg (4 mg intravenous Given 2/6/24 1915)        Diagnostic/tests  Labs Reviewed   CBC WITH AUTO DIFFERENTIAL - Abnormal       Result Value    WBC 16.1 (*)     nRBC 0.0      RBC 6.14 (*)     Hemoglobin 16.7 (*)     Hematocrit 51.1 (*)     MCV 83      MCH 27.2      MCHC 32.7      RDW 13.7      Platelets 368      Neutrophils % 67.5      Immature Granulocytes %, Automated 0.7      Lymphocytes % 24.0      Monocytes % 5.0      Eosinophils % 2.4      Basophils % 0.4      Neutrophils Absolute 10.84 (*)     Immature Granulocytes Absolute, Automated 0.12      Lymphocytes Absolute 3.86      Monocytes Absolute 0.80      Eosinophils Absolute 0.38      Basophils Absolute 0.06     COMPREHENSIVE METABOLIC PANEL - Abnormal    Glucose 111 (*)     Sodium 136      Potassium 4.0      Chloride 101      Bicarbonate 23 (*)     Urea Nitrogen 16      Creatinine 0.80      eGFR >90      Calcium 9.8      Albumin 4.2      Alkaline Phosphatase 57      Total Protein 7.3      AST 12      Bilirubin, Total 0.3      ALT 14      Anion Gap 12     CBC WITH AUTO DIFFERENTIAL - Abnormal    WBC 18.8 (*)     nRBC 0.0      RBC 5.52 (*)     Hemoglobin 15.0      Hematocrit 46.2 (*)     MCV 84      MCH 27.2      MCHC 32.5      RDW 13.6      Platelets 291      Neutrophils % 75.6      Immature Granulocytes %, Automated 0.7      Lymphocytes % 16.0      Monocytes % 5.1      Eosinophils % 2.3      Basophils % 0.3      Neutrophils Absolute 14.17 (*)     Immature Granulocytes Absolute, Automated 0.13      Lymphocytes Absolute 3.00      Monocytes Absolute  0.96      Eosinophils Absolute 0.43      Basophils Absolute 0.06     LIPASE - Normal    Lipase 27     MAGNESIUM - Normal    Magnesium 1.90     SARS-COV-2 PCR - Normal    Coronavirus 2019, PCR Not Detected      Narrative:     This assay has received FDA Emergency Use Authorization (EUA) and is only authorized for the duration of time that circumstances exist to justify the authorization of the emergency use of in vitro diagnostic tests for the detection of SARS-CoV-2 virus and/or diagnosis of COVID-19 infection under section 564(b)(1) of the Act, 21 U.S.C. 360bbb-3(b)(1). This assay is an in vitro diagnostic nucleic acid amplification test for the qualitative detection of SARS-CoV-2 from nasopharyngeal specimens and has been validated for use at [facility]. Negative results do not preclude COVID-19 infections and should not be used as the sole basis for diagnosis, treatment, or other management decisions.     INFLUENZA A AND B PCR - Normal    Flu A Result Not Detected      Flu B Result Not Detected      Narrative:     This assay is an in vitro diagnostic multiplex nucleic acid amplification test for the detection and discrimination of Influenza A & B from nasopharyngeal specimens, and has been validated for use at [facility]. Negative results do not preclude Influenza A/B infections, and should not be used as the sole basis for diagnosis, treatment, or other management decisions. If Influenza A/B and RSV PCR results are negative, testing for Parainfluenza virus, Adenovirus and Metapneumovirus is routinely performed for [facility] pediatric oncology and intensive care inpatients, and is available on other patients by placing an add-on request.   URINALYSIS WITH REFLEX CULTURE AND MICROSCOPIC - Normal    Color, Urine Light-Yellow      Appearance, Urine Clear      Specific Gravity, Urine 1.024      pH, Urine 5.5      Protein, Urine NEGATIVE      Glucose, Urine Normal      Blood,  Urine NEGATIVE      Ketones, Urine NEGATIVE      Bilirubin, Urine NEGATIVE      Urobilinogen, Urine Normal      Nitrite, Urine NEGATIVE      Leukocyte Esterase, Urine NEGATIVE     HUMAN CHORIONIC GONADOTROPIN, SERUM QUANTITATIVE    HCG, Beta-Quantitative <1     URINALYSIS WITH REFLEX CULTURE AND MICROSCOPIC    Narrative:     The following orders were created for panel order Urinalysis with Reflex Culture and Microscopic.  Procedure                               Abnormality         Status                     ---------                               -----------         ------                     Urinalysis with Reflex C...[[id number]]  Normal              Final result               Extra Urine Gray Tube[[id number]]                                                         Please view results for these tests on the individual orders.   EXTRA URINE GRAY TUBE      CT abdomen pelvis w IV contrast   Final Result   Small hiatal hernia.        There are 3 hypodense liver lesions identified which do not have the   appearance of cysts. The possibility of hepatic hemangiomas is not   excluded. A nonemergent MRI of the liver could be performed for   further evaluation.        5.2 x 8.7 x 9.2 cm left adnexal mass containing adipose tissue most   consistent with large left ovarian dermoid/teratoma. The right ovary   is prominent containing a number of follicles.        Physiologic amount of free fluid in the pelvic cul-de-sac.        2.2 x 3.2 x 3.2 cm soft tissue mass in the left inguinal region with   additional soft tissue mass in the midline along the inferior aspect   of the linea alba. This particular soft tissue mass in the midline   was evaluated on ultrasound from 05/10/2016 and has not changed   significantly in size. Differential diagnosis would include   postoperative scarring/fibrosis/desmoid tumors or perhaps even   endometriosis.        Signed by: Brianna Garcia 2/6/2024 6:55 PM   Dictation workstation:   [id number]       XR chest 1 view   Final Result   No acute abnormalities.        MACRO:   None        Signed by: Franc Summers 2/6/2024 4:30 PM   Dictation workstation:   [id number]             EKG: Obtained read by attending physician reviewed by myself, per my interpretation no sign of STEMI criteria    Diagnostic tests considered but not performed: Considered ultrasound of the pelvis however patient has no lower abdominal pain.  I also spoke to the OB/GYN on-call, they reviewed the current diagnostics and stated that with the likelihood of it being a dermoid and comparing it to previous CT they also would recommend that without any lower abdominal discomfort they would not feel that it would be urgently recommended    Case discussed with: Dr. Rosario    Prescription medications considered: Zofran and Pepcid    Considerations/further MDM:  I estimate there is low risk for acute abdomen,  I have considered to following, acute abdomen, acute appendicitis, acute bowel obstruction, cholecystitis, diverticulitis, incarcerated or strangulated hernia, mesenteric ischemia, pancreatitis, pelvic inflammatory disease, bowel perforation, ulcer, ectopic pregnancy, tubo-ovarian abscess, gynecologic etiologies, pregnancy, dehydration, electrolyte disturbances, of which require urgent/emergent intervention, -thus I consider the discharge disposition reasonable. Also, there is no evidence or peritonitis, acute liver failure, renal failure, UTI/Pyelonephritis to an extent that requires admission and IV abx sepsis, or toxicity. We have discussed the diagnosis and risks, and we agree with discharging home to follow-up with their primary doctor. We also discussed returning to the Emergency Department immediately if new or worsening symptoms occur. We have discussed the symptoms which are most concerning (e.g., bloody stool, fever, changing or worsening pain, vomiting) that necessitate immediate return.    The patient has anxiety which is clear,  patient is breathing comfortably, initially little tachycardic however believe this was more of an anxiety component, patient had some dry heaving, on physical examination she has no localizing pain of the abdomen, no guarding or rebound.  The patient has no lower abdominal discomfort at all on serial abdominal evaluations.  I spoke to the OB/GYN on-call, discussed the case in detail, and they also feel that discharge with outpatient follow-up is most reasonable as they feel this is most likely a dermoid mass.  However return precautions and follow-up instructions have been discussed at length.  The initial tachycardia has since resolved.      Procedure  Procedures     Marvin Millard PA-C  02/06/24 1930

## 2024-02-08 LAB
ATRIAL RATE: 102 BPM
P AXIS: 68 DEGREES
P OFFSET: 204 MS
P ONSET: 160 MS
PR INTERVAL: 126 MS
Q ONSET: 223 MS
QRS COUNT: 17 BEATS
QRS DURATION: 76 MS
QT INTERVAL: 338 MS
QTC CALCULATION(BAZETT): 440 MS
QTC FREDERICIA: 403 MS
R AXIS: 72 DEGREES
T AXIS: 72 DEGREES
T OFFSET: 392 MS
VENTRICULAR RATE: 102 BPM

## 2024-04-16 ENCOUNTER — APPOINTMENT (OUTPATIENT)
Dept: RADIOLOGY | Facility: HOSPITAL | Age: 44
End: 2024-04-16
Payer: COMMERCIAL

## 2024-04-16 ENCOUNTER — PHARMACY VISIT (OUTPATIENT)
Dept: PHARMACY | Facility: CLINIC | Age: 44
End: 2024-04-16
Payer: MEDICAID

## 2024-04-16 ENCOUNTER — HOSPITAL ENCOUNTER (EMERGENCY)
Facility: HOSPITAL | Age: 44
Discharge: HOME | End: 2024-04-16
Attending: EMERGENCY MEDICINE
Payer: COMMERCIAL

## 2024-04-16 VITALS
SYSTOLIC BLOOD PRESSURE: 111 MMHG | HEART RATE: 89 BPM | TEMPERATURE: 98.2 F | RESPIRATION RATE: 16 BRPM | WEIGHT: 190 LBS | BODY MASS INDEX: 34.96 KG/M2 | HEIGHT: 62 IN | DIASTOLIC BLOOD PRESSURE: 66 MMHG

## 2024-04-16 DIAGNOSIS — J20.9 ACUTE BRONCHITIS, UNSPECIFIED ORGANISM: Primary | ICD-10-CM

## 2024-04-16 LAB
FLUAV RNA RESP QL NAA+PROBE: NOT DETECTED
FLUBV RNA RESP QL NAA+PROBE: NOT DETECTED
RSV RNA RESP QL NAA+PROBE: NOT DETECTED
SARS-COV-2 RNA RESP QL NAA+PROBE: NOT DETECTED

## 2024-04-16 PROCEDURE — 2500000001 HC RX 250 WO HCPCS SELF ADMINISTERED DRUGS (ALT 637 FOR MEDICARE OP): Mod: SE | Performed by: EMERGENCY MEDICINE

## 2024-04-16 PROCEDURE — 94664 DEMO&/EVAL PT USE INHALER: CPT

## 2024-04-16 PROCEDURE — 99283 EMERGENCY DEPT VISIT LOW MDM: CPT | Mod: 25

## 2024-04-16 PROCEDURE — 71046 X-RAY EXAM CHEST 2 VIEWS: CPT | Performed by: RADIOLOGY

## 2024-04-16 PROCEDURE — 71046 X-RAY EXAM CHEST 2 VIEWS: CPT

## 2024-04-16 PROCEDURE — RXMED WILLOW AMBULATORY MEDICATION CHARGE

## 2024-04-16 PROCEDURE — 87637 SARSCOV2&INF A&B&RSV AMP PRB: CPT | Performed by: EMERGENCY MEDICINE

## 2024-04-16 RX ORDER — ALBUTEROL SULFATE 90 UG/1
2 AEROSOL, METERED RESPIRATORY (INHALATION) ONCE
Status: COMPLETED | OUTPATIENT
Start: 2024-04-16 | End: 2024-04-16

## 2024-04-16 RX ORDER — AZITHROMYCIN 250 MG/1
250 TABLET, FILM COATED ORAL DAILY
Qty: 6 TABLET | Refills: 0 | Status: SHIPPED | OUTPATIENT
Start: 2024-04-16 | End: 2024-04-22

## 2024-04-16 RX ADMIN — ALBUTEROL SULFATE 2 PUFF: 90 AEROSOL, METERED RESPIRATORY (INHALATION) at 16:40

## 2024-04-16 ASSESSMENT — PAIN SCALES - GENERAL
PAINLEVEL_OUTOF10: 0 - NO PAIN
PAINLEVEL_OUTOF10: 0 - NO PAIN

## 2024-04-16 ASSESSMENT — PAIN - FUNCTIONAL ASSESSMENT: PAIN_FUNCTIONAL_ASSESSMENT: 0-10

## 2024-04-16 NOTE — ED PROVIDER NOTES
HPI   Chief Complaint   Patient presents with    Nasal Congestion     Nasal congestion for a week and cough that started 2 days ago       HPI                    [place] Coma Scale Score: 15                     Patient History   Past Medical History:   Diagnosis Date    Asperger's syndrome ([facility])     Unspecified asthma, uncomplicated ([facility]) 10/23/2017    Asthma     Past Surgical History:   Procedure Laterality Date     SECTION, CLASSIC  2014     Section    HAND SURGERY  2014    Hand Surgery                                                                                                                                                           No family history on file.  Social History     Tobacco Use    Smoking status: Some Days     Types: Cigarettes    Smokeless tobacco: Never   Substance Use Topics    Alcohol use: Not on file    Drug use: Not on file       Physical Exam   ED Triage Vitals [24 1443]   Temperature Heart Rate Respirations BP   36.8 °C (98.2 °F) 89 16 111/66      SpO2 Temp src Heart Rate Source Patient Position   -- -- Monitor Sitting      BP Location FiO2 (%)     Left arm --       Physical Exam  Constitutional:       General: She is not in acute distress.     Appearance: Normal appearance. She is not toxic-appearing.   HENT:      Head: Normocephalic and atraumatic.      Right Ear: Tympanic membrane normal.      Left Ear: Tympanic membrane normal.      Mouth/Throat:      Mouth: Mucous membranes are moist.      Pharynx: Oropharynx is clear.   Eyes:      Conjunctiva/sclera: Conjunctivae normal.      Pupils: Pupils are equal, round, and reactive to light.   Cardiovascular:      Rate and Rhythm: Normal rate and regular rhythm.      Pulses: Normal pulses.      Heart sounds: Normal heart sounds.   Pulmonary:      Effort: Pulmonary effort is normal. No respiratory distress.      Breath sounds: Wheezing present.   Abdominal:      General: Bowel sounds are normal.       Palpations: Abdomen is soft.      Tenderness: There is no abdominal tenderness. There is no guarding or rebound.   Musculoskeletal:         General: Normal range of motion.      Cervical back: Normal range of motion.   Skin:     General: Skin is warm and dry.   Neurological:      General: No focal deficit present.      Mental Status: She is alert and oriented to person, place, and time.         ED Course & MDM   Diagnoses as of 04/16/24 1640   Acute bronchitis, unspecified organism       Medical Decision Making  Overall well-appearing 43-year-old female patient presents to the ER with chief complaint of cough and congestion.  X-rays negative influenza and COVID-negative.  Patient has a chronic lung disease for this reason we will cover with antibiotics.  Patient will be given a spacer and a metered-dose inhaler.  Patient will be discharged home recommend follow-up with PCP.        Procedure  Procedures     Gaurav Montoya, DO  04/16/24 1640

## 2024-04-24 ENCOUNTER — HOSPITAL ENCOUNTER (EMERGENCY)
Facility: HOSPITAL | Age: 44
Discharge: HOME | End: 2024-04-24
Payer: COMMERCIAL

## 2024-04-24 ENCOUNTER — APPOINTMENT (OUTPATIENT)
Dept: RADIOLOGY | Facility: HOSPITAL | Age: 44
End: 2024-04-24
Payer: COMMERCIAL

## 2024-04-24 VITALS
SYSTOLIC BLOOD PRESSURE: 127 MMHG | DIASTOLIC BLOOD PRESSURE: 77 MMHG | WEIGHT: 190 LBS | BODY MASS INDEX: 34.96 KG/M2 | TEMPERATURE: 98.1 F | HEART RATE: 82 BPM | RESPIRATION RATE: 18 BRPM | HEIGHT: 62 IN | OXYGEN SATURATION: 98 %

## 2024-04-24 DIAGNOSIS — M54.50 ACUTE BILATERAL LOW BACK PAIN WITHOUT SCIATICA: Primary | ICD-10-CM

## 2024-04-24 LAB
APPEARANCE UR: CLEAR
BILIRUB UR STRIP.AUTO-MCNC: NEGATIVE MG/DL
COLOR UR: YELLOW
GLUCOSE UR STRIP.AUTO-MCNC: NORMAL MG/DL
KETONES UR STRIP.AUTO-MCNC: NEGATIVE MG/DL
LEUKOCYTE ESTERASE UR QL STRIP.AUTO: NEGATIVE
MUCOUS THREADS #/AREA URNS AUTO: NORMAL /LPF
NITRITE UR QL STRIP.AUTO: NEGATIVE
PH UR STRIP.AUTO: 6 [PH]
PROT UR STRIP.AUTO-MCNC: NORMAL MG/DL
RBC # UR STRIP.AUTO: NEGATIVE /UL
RBC #/AREA URNS AUTO: NORMAL /HPF
SP GR UR STRIP.AUTO: 1.02
SQUAMOUS #/AREA URNS AUTO: NORMAL /HPF
UROBILINOGEN UR STRIP.AUTO-MCNC: NORMAL MG/DL
WBC #/AREA URNS AUTO: NORMAL /HPF

## 2024-04-24 PROCEDURE — 2500000001 HC RX 250 WO HCPCS SELF ADMINISTERED DRUGS (ALT 637 FOR MEDICARE OP): Mod: SE

## 2024-04-24 PROCEDURE — 96372 THER/PROPH/DIAG INJ SC/IM: CPT

## 2024-04-24 PROCEDURE — 71046 X-RAY EXAM CHEST 2 VIEWS: CPT | Mod: FOREIGN READ | Performed by: RADIOLOGY

## 2024-04-24 PROCEDURE — 71046 X-RAY EXAM CHEST 2 VIEWS: CPT

## 2024-04-24 PROCEDURE — 72131 CT LUMBAR SPINE W/O DYE: CPT | Mod: FOREIGN READ | Performed by: RADIOLOGY

## 2024-04-24 PROCEDURE — 72131 CT LUMBAR SPINE W/O DYE: CPT

## 2024-04-24 PROCEDURE — 2500000005 HC RX 250 GENERAL PHARMACY W/O HCPCS: Mod: SE

## 2024-04-24 PROCEDURE — 99284 EMERGENCY DEPT VISIT MOD MDM: CPT | Mod: 25

## 2024-04-24 PROCEDURE — 2500000004 HC RX 250 GENERAL PHARMACY W/ HCPCS (ALT 636 FOR OP/ED): Mod: SE

## 2024-04-24 PROCEDURE — 81001 URINALYSIS AUTO W/SCOPE: CPT

## 2024-04-24 RX ORDER — LIDOCAINE 50 MG/G
1 PATCH TOPICAL DAILY
Qty: 7 PATCH | Refills: 0 | Status: SHIPPED | OUTPATIENT
Start: 2024-04-24

## 2024-04-24 RX ORDER — DEXAMETHASONE SODIUM PHOSPHATE 10 MG/ML
INJECTION INTRAMUSCULAR; INTRAVENOUS
Status: DISCONTINUED
Start: 2024-04-24 | End: 2024-04-24 | Stop reason: WASHOUT

## 2024-04-24 RX ORDER — NAPROXEN 500 MG/1
500 TABLET ORAL
Qty: 30 TABLET | Refills: 0 | Status: SHIPPED | OUTPATIENT
Start: 2024-04-24 | End: 2024-05-09

## 2024-04-24 RX ORDER — METHOCARBAMOL 500 MG/1
500 TABLET, FILM COATED ORAL 3 TIMES DAILY
Qty: 21 TABLET | Refills: 0 | Status: SHIPPED | OUTPATIENT
Start: 2024-04-24 | End: 2024-05-01

## 2024-04-24 RX ORDER — HYDROCODONE BITARTRATE AND ACETAMINOPHEN 5; 325 MG/1; MG/1
TABLET ORAL
Status: COMPLETED
Start: 2024-04-24 | End: 2024-04-24

## 2024-04-24 RX ORDER — HYDROCODONE BITARTRATE AND ACETAMINOPHEN 5; 325 MG/1; MG/1
1 TABLET ORAL ONCE
Status: COMPLETED | OUTPATIENT
Start: 2024-04-24 | End: 2024-04-24

## 2024-04-24 RX ORDER — ORPHENADRINE CITRATE 30 MG/ML
60 INJECTION INTRAMUSCULAR; INTRAVENOUS ONCE
Status: COMPLETED | OUTPATIENT
Start: 2024-04-24 | End: 2024-04-24

## 2024-04-24 RX ORDER — LIDOCAINE 560 MG/1
1 PATCH PERCUTANEOUS; TOPICAL; TRANSDERMAL DAILY
Status: DISCONTINUED | OUTPATIENT
Start: 2024-04-24 | End: 2024-04-24 | Stop reason: HOSPADM

## 2024-04-24 RX ADMIN — LIDOCAINE 4% 1 PATCH: 40 PATCH TOPICAL at 14:06

## 2024-04-24 RX ADMIN — ORPHENADRINE CITRATE 60 MG: 60 INJECTION INTRAMUSCULAR; INTRAVENOUS at 14:07

## 2024-04-24 RX ADMIN — HYDROCODONE BITARTRATE AND ACETAMINOPHEN 1 TABLET: 5; 325 TABLET ORAL at 16:25

## 2024-04-24 RX ADMIN — DEXAMETHASONE SODIUM PHOSPHATE 10 MG: 4 INJECTION, SOLUTION INTRAMUSCULAR; INTRAVENOUS at 16:23

## 2024-04-24 ASSESSMENT — COLUMBIA-SUICIDE SEVERITY RATING SCALE - C-SSRS
2. HAVE YOU ACTUALLY HAD ANY THOUGHTS OF KILLING YOURSELF?: NO
6. HAVE YOU EVER DONE ANYTHING, STARTED TO DO ANYTHING, OR PREPARED TO DO ANYTHING TO END YOUR LIFE?: NO
1. IN THE PAST MONTH, HAVE YOU WISHED YOU WERE DEAD OR WISHED YOU COULD GO TO SLEEP AND NOT WAKE UP?: NO

## 2024-04-24 ASSESSMENT — PAIN SCALES - GENERAL: PAINLEVEL_OUTOF10: 8

## 2024-04-24 ASSESSMENT — PAIN - FUNCTIONAL ASSESSMENT: PAIN_FUNCTIONAL_ASSESSMENT: 0-10

## 2024-04-24 ASSESSMENT — PAIN DESCRIPTION - LOCATION: LOCATION: BACK

## 2024-04-24 NOTE — ED TRIAGE NOTES
Patient presents to ED for c/o low back pain onset last night, mid to right side, patient was just evaluated at [facility] and given 2 pain medications and discharged. States she is having worsened pain when attempting to walk, also c/o pain with breathing. States she feels like she has had decreased urination and coughing up a lot of phlegm

## 2024-04-24 NOTE — ED PROVIDER NOTES
Limitations to history: None  Independent Historians: Family  External Records Reviewed: HIE, OARRS, outpatient notes, inpatient notes, paper charts if needed    History of Present Illness:  Patient is a 43-year-old female presents to ED chief complaint of right lower back pain since yesterday.  Patient arrives to ED from [facility] ER.  Patient reports she was discharged from [facility], given pain medications.  Patient reports she wants a second opinion.  Reports that her pain is worse when bending or ambulating.  Patient also reports some intermittent chills, denies history of IV drug abuse, denies any saddle anesthesia, denies loss of bowel or bladder.  Patient also reports ports a decrease in urination and a cough over the past 2 days.  Patient has a known past medical history of asthma.  Reports she is a current every day smoker.  Patient denies any recent injury and/or trauma.  Patient is alert and oriented x 3 upon examination and otherwise no apparent distress.      Denies HA, C/P, SOB, ABD pain, Nausea, Vomiting, Diarrhea, Weakness, Dizziness, Fever, Chills.    PMFSH:   As per HPI, otherwise nurses notes reviewed in EMR    Physical Exam:  Appearance: Alert, oriented x3, supine on exam table with head elevated, cooperative, in no acute distress. Well nourished & well hydrated.      Skin: Intact, dry skin, no lesions, rash, petechiae or purpura.     Eyes: PERRLA, EOMs intact, Conjunctiva pink with no redness or exudates. No scleral icterus.     Ears: Hearing grossly intact.      Nose: Nares patent, no epistaxis.     Mouth: Dentition without concerning abnormalities. no obstruction of posterior pharynx.     Neck: Supple, without meningismus. Trachea at midline.     Pulmonary: Clear bilaterally with good chest wall excursion. No rales, rhonchi or wheezing. No accessory muscle use or stridor. Talking in full sentences.     Cardiac: Normal S1, S2 without murmur, rub, gallop or extrasystole.     Abdomen: Soft, nontender to  light and deep palpation to all quadrants, normoactive bowel sounds.  No palpable organomegaly.  No rebound or guarding.     Genitourinary: Physical exam deferred.     Musculoskeletal: Mild pain on palpation noted to the lateral aspect of right lumbar spine.  No midline spinal tenderness observed, no step-offs appreciated.  No obvious lesions, ecchymosis noted.  Normal gait. Full range of motion to all extremities. Rest of the exam reveals no pain on palpation, instability, or deformity. Pulses full and equal. No cyanosis or clubbing. capillary refill <2 seconds to all examined digits.     Neurological:  Cranial nerves II through XII are grossly intact, normal sensation, no weakness, no focal findings identified.      Psychiatric: Appropriate mood and affect.      Labs Reviewed   URINALYSIS WITH REFLEX CULTURE AND MICROSCOPIC - Normal       Result Value    Color, Urine Yellow      Appearance, Urine Clear      Specific Gravity, Urine 1.025      pH, Urine 6.0      Protein, Urine 20 (TRACE)      Glucose, Urine Normal      Blood, Urine NEGATIVE      Ketones, Urine NEGATIVE      Bilirubin, Urine NEGATIVE      Urobilinogen, Urine Normal      Nitrite, Urine NEGATIVE      Leukocyte Esterase, Urine NEGATIVE     URINALYSIS WITH REFLEX CULTURE AND MICROSCOPIC    Narrative:     The following orders were created for panel order Urinalysis with Reflex Culture and Microscopic.  Procedure                               Abnormality         Status                     ---------                               -----------         ------                     Urinalysis with Reflex C...[[id number]]  Normal              Final result               Extra Urine Gray Tube[[id number]]                            In process                   Please view results for these tests on the individual orders.   EXTRA URINE GRAY TUBE   URINALYSIS MICROSCOPIC WITH REFLEX CULTURE    WBC, Urine 1-5      RBC, Urine NONE      Squamous Epithelial Cells, Urine 1-9  (SPARSE)      Mucus, Urine 1+        CT lumbar spine wo IV contrast   Final Result   No evidence of acute fracture or subluxation.   Degenerative changes the lumbosacral spine at L3-4 with broad-based   disc bulge most pronounced on the left with narrowing of the left   neural foramina.   Signed by Dash Pena MD      XR chest 2 views   Final Result   No radiographic evidence of acute cardiopulmonary disease.   Signed by Dash Pena MD             Repeat Evaluation below    Summary:  Medical Decision Making:   Patient presented as described in HPI. Patient case including ROS, PE, and treatment and plan discussed with ED attending if attached as cosigner. Due to patients presentation orders completed include as documented.  Patient evaluated for complaints of lower back pain, cough and decreased urination.  Patient was found to be afebrile, nontachycardic, nonhypoxic.  Patient denied any complaints of saddle anesthesia, urinary or fecal incontinence, denies any history of IV drug abuse.  Urinalysis was within normal limits.  CT lumbar spine revealed no evidence of acute fracture or subluxation.  Degenerative changes noted through the lumbar sacral spine L3-L4 with broad-based disc bulge most pronounced on the left with narrowing of the left neural foramina.  Patient was given lidocaine pain patch as well as Norflex.  Patient reports improvement in symptoms while in ED.  Patient will be given a one-time dosage of Decadron, will be sent home with Robaxin as well as naproxen to take as needed for lower back pain.  Patient will be referred to orthospine advised to follow-up within 1 to 2 weeks.  Patient was advised to follow up with PCP or recommended provider in 2-3 days for another evaluation and exam. I advised patient/guardian to return or go to closest emergency room immediately if symptoms change, get worse, new symptoms develop prior to follow up. If there is no improvement in symptoms in the next 24 hours they  are advised to return for further evaluation and exam. I also explained the plan and treatment course. Patient/guardian is in agreement with plan, treatment course, and follow up and states verbally that they will comply.    Tests/Medications/Escalations of Care considered but not given:    Patient care discussed with: N/A  Social Determinants affecting care: N/A    Final diagnosis and disposition as documented in impression    Homegoing. I discussed the differential; results and discharge plan with the patient and/or family/friend/caregiver if present.  I emphasized the importance of follow-up with the physician I referred them to in the timeframe recommended.  I explained reasons for the patient to return to the Emergency Department. They agreed that if they feel their condition is worsening or if they have any other concern they should call 911 immediately for further assistance. I gave the patient an opportunity to ask all questions they had and answered all of them accordingly. They understand return precautions and discharge instructions. The patient and/or family/friend/caregiver expressed understanding verbally and that they would comply.       Disposition:  Discharge         This note has been transcribed using voice recognition and may contain grammatical errors, misplaced words, incorrect words, incorrect phrases or other errors.      Alma Norris, DEYVI-CNP  04/24/24 0761

## 2024-04-25 LAB — HOLD SPECIMEN: NORMAL

## 2024-10-09 ENCOUNTER — APPOINTMENT (OUTPATIENT)
Facility: CLINIC | Age: 44
End: 2024-10-09
Payer: COMMERCIAL

## 2024-10-09 PROBLEM — R10.9 ABDOMINAL PAIN: Status: ACTIVE | Noted: 2024-10-09

## 2024-10-09 PROBLEM — K65.9 PERITONITIS: Status: ACTIVE | Noted: 2021-05-18

## 2024-10-09 PROBLEM — R20.2 PARESTHESIA OF UPPER EXTREMITY: Status: ACTIVE | Noted: 2024-10-09

## 2024-10-09 PROBLEM — H65.03 BILATERAL ACUTE SEROUS OTITIS MEDIA: Status: ACTIVE | Noted: 2018-06-20

## 2024-10-09 PROBLEM — F12.90 MARIJUANA USE, EPISODIC: Status: ACTIVE | Noted: 2022-06-13

## 2024-10-09 PROBLEM — F12.20 CANNABIS DEPENDENCE: Chronic | Status: ACTIVE | Noted: 2024-03-01

## 2024-10-09 PROBLEM — L20.9 ATOPIC DERMATITIS, UNSPECIFIED: Status: ACTIVE | Noted: 2021-05-25

## 2024-10-09 PROBLEM — K57.92 DIVERTICULITIS: Status: ACTIVE | Noted: 2021-05-18

## 2024-10-09 PROBLEM — H10.9 CONJUNCTIVITIS OF LEFT EYE: Status: ACTIVE | Noted: 2024-10-09

## 2024-10-09 PROBLEM — R93.2 ABNORMAL FINDINGS ON DIAGNOSTIC IMAGING OF LIVER AND BILIARY TRACT: Status: ACTIVE | Noted: 2021-05-25

## 2024-10-09 PROBLEM — I10 DIASTOLIC HYPERTENSION: Status: ACTIVE | Noted: 2024-10-09

## 2024-10-09 PROBLEM — R73.9 HYPERGLYCEMIA, UNSPECIFIED: Status: ACTIVE | Noted: 2021-05-25

## 2024-10-09 PROBLEM — F17.200 NICOTINE DEPENDENCE: Status: ACTIVE | Noted: 2024-10-09

## 2024-10-09 PROBLEM — R06.02 SHORTNESS OF BREATH: Status: ACTIVE | Noted: 2024-10-09

## 2024-10-09 PROBLEM — E78.49 OTHER HYPERLIPIDEMIA: Status: ACTIVE | Noted: 2024-03-12

## 2024-10-09 PROBLEM — J18.1 LOBAR PNEUMONIA (CMS-HCC): Status: ACTIVE | Noted: 2024-10-09

## 2024-10-09 PROBLEM — K21.9 GERD (GASTROESOPHAGEAL REFLUX DISEASE): Status: ACTIVE | Noted: 2024-10-09

## 2024-10-09 PROBLEM — L73.9 FOLLICULITIS: Status: ACTIVE | Noted: 2024-10-09

## 2024-10-09 PROBLEM — R05.9 COUGH: Status: ACTIVE | Noted: 2018-06-20

## 2024-10-09 PROBLEM — N91.2 AMENORRHEA: Status: ACTIVE | Noted: 2024-10-09

## 2024-10-09 PROBLEM — G56.03 BILATERAL CARPAL TUNNEL SYNDROME: Status: ACTIVE | Noted: 2017-12-12

## 2024-10-09 PROBLEM — D72.829 LEUKOCYTOSIS: Status: ACTIVE | Noted: 2024-10-09

## 2024-10-09 PROBLEM — I80.02 THROMBOPHLEBITIS OF SUPERFICIAL VEINS OF LEFT LOWER EXTREMITY: Status: ACTIVE | Noted: 2022-03-31

## 2024-10-09 PROBLEM — T78.40XA ALLERGIC REACTION: Status: ACTIVE | Noted: 2024-10-09

## 2024-10-09 PROBLEM — R11.2 NAUSEA AND VOMITING: Status: ACTIVE | Noted: 2024-10-09

## 2024-10-09 PROBLEM — J20.9 ACUTE BRONCHITIS: Status: ACTIVE | Noted: 2024-10-09

## 2024-10-09 PROBLEM — R63.5 ABNORMAL WEIGHT GAIN: Status: ACTIVE | Noted: 2021-05-25

## 2024-10-09 PROBLEM — M79.643 HAND PAIN: Status: ACTIVE | Noted: 2024-10-09

## 2024-10-09 PROBLEM — R53.83 OTHER FATIGUE: Status: ACTIVE | Noted: 2021-05-25

## 2024-10-09 PROBLEM — R00.0 TACHYCARDIA, UNSPECIFIED: Status: ACTIVE | Noted: 2021-05-25

## 2024-10-09 PROBLEM — D36.9 DERMOID CYST: Status: ACTIVE | Noted: 2021-05-18

## 2024-10-09 PROBLEM — H66.91 ACUTE OTITIS MEDIA, RIGHT: Status: ACTIVE | Noted: 2019-04-05

## 2024-10-09 PROBLEM — I51.7 CARDIOMEGALY: Status: ACTIVE | Noted: 2021-05-25

## 2024-10-09 PROBLEM — R60.9 EDEMA, UNSPECIFIED: Status: ACTIVE | Noted: 2021-05-25

## 2024-10-09 PROBLEM — R10.32 LEFT LOWER QUADRANT PAIN: Status: ACTIVE | Noted: 2024-10-09

## 2024-10-09 PROBLEM — M06.9 RHEUMATOID ARTHRITIS, UNSPECIFIED: Chronic | Status: ACTIVE | Noted: 2021-05-25

## 2024-10-09 PROBLEM — R16.0 LIVER MASS: Status: ACTIVE | Noted: 2024-10-09

## 2024-10-09 PROBLEM — M25.511 PAIN OF RIGHT SHOULDER REGION: Status: ACTIVE | Noted: 2024-10-09

## 2024-10-09 PROBLEM — R20.0 ANESTHESIA OF SKIN: Status: ACTIVE | Noted: 2024-10-09

## 2024-10-09 PROBLEM — F41.9 ANXIETY: Status: ACTIVE | Noted: 2024-10-09

## 2024-10-11 ENCOUNTER — APPOINTMENT (OUTPATIENT)
Facility: CLINIC | Age: 44
End: 2024-10-11
Payer: COMMERCIAL

## 2024-10-15 ENCOUNTER — HOSPITAL ENCOUNTER (EMERGENCY)
Facility: HOSPITAL | Age: 44
Discharge: HOME | End: 2024-10-15
Attending: FAMILY MEDICINE
Payer: COMMERCIAL

## 2024-10-15 VITALS
OXYGEN SATURATION: 99 % | WEIGHT: 182 LBS | TEMPERATURE: 98.1 F | BODY MASS INDEX: 33.49 KG/M2 | RESPIRATION RATE: 18 BRPM | SYSTOLIC BLOOD PRESSURE: 110 MMHG | HEART RATE: 99 BPM | HEIGHT: 62 IN | DIASTOLIC BLOOD PRESSURE: 75 MMHG

## 2024-10-15 DIAGNOSIS — L03.211 FACIAL CELLULITIS: Primary | ICD-10-CM

## 2024-10-15 PROCEDURE — 99283 EMERGENCY DEPT VISIT LOW MDM: CPT

## 2024-10-15 PROCEDURE — 2500000001 HC RX 250 WO HCPCS SELF ADMINISTERED DRUGS (ALT 637 FOR MEDICARE OP): Mod: SE

## 2024-10-15 RX ORDER — DOXYCYCLINE 100 MG/1
100 CAPSULE ORAL ONCE
Status: COMPLETED | OUTPATIENT
Start: 2024-10-15 | End: 2024-10-15

## 2024-10-15 RX ORDER — DOXYCYCLINE HYCLATE 100 MG
TABLET ORAL
Status: COMPLETED
Start: 2024-10-15 | End: 2024-10-15

## 2024-10-15 RX ORDER — DOXYCYCLINE 100 MG/1
100 CAPSULE ORAL 2 TIMES DAILY
Qty: 14 CAPSULE | Refills: 0 | Status: SHIPPED | OUTPATIENT
Start: 2024-10-15 | End: 2024-10-22

## 2024-10-15 ASSESSMENT — PAIN SCALES - GENERAL: PAINLEVEL_OUTOF10: 4

## 2024-10-15 ASSESSMENT — VISUAL ACUITY: OU: 1

## 2024-10-15 ASSESSMENT — PAIN - FUNCTIONAL ASSESSMENT: PAIN_FUNCTIONAL_ASSESSMENT: 0-10

## 2024-10-15 ASSESSMENT — PAIN DESCRIPTION - PROGRESSION: CLINICAL_PROGRESSION: NOT CHANGED

## 2024-10-15 NOTE — ED PROVIDER NOTES
HPI   Chief Complaint   Patient presents with    Wound Check     Pt has open area over R eye since yesterday. Eyelid slightly swollen.  No vision impairment.        43-year-old female comes diet with complaint of facial redness and swelling that occurred the last 2 days.  Patient reports that she was picking at her right eyebrow and got some adjacent eczema but noted that the redness persisted and began having some swelling at the area.  Patient also reports some drainage at the site where she was plucking her eyebrows and was concerned she may have infection and came to the ED today.  Patient in the ED is alert, cooperative, appears anxious, uncomfortable, but in no distress.  Patient reports no other associate symptoms or complaints at this time.      History provided by:  Patient and medical records   used: No            Patient History   Past Medical History:   Diagnosis Date    Asperger's syndrome     Unspecified asthma, uncomplicated ([facility]) 10/23/2017    Asthma     Past Surgical History:   Procedure Laterality Date     SECTION, CLASSIC  2014     Section    HAND SURGERY  2014    Hand Surgery                                                                                                                                                           No family history on file.  Social History     Tobacco Use    Smoking status: Some Days     Types: Cigarettes    Smokeless tobacco: Never   Substance Use Topics    Alcohol use: Never    Drug use: Yes     Types: Marijuana       Physical Exam   ED Triage Vitals [10/15/24 1632]   Temperature Heart Rate Respirations BP   36.7 °C (98.1 °F) 99 18 110/75      SpO2 Temp src Heart Rate Source Patient Position   99 % -- -- --      BP Location FiO2 (%)     -- --       Physical Exam  Vitals and nursing note reviewed.   Constitutional:       General: She is not in acute distress.     Appearance: She is well-developed.   HENT:       Head: Normocephalic and atraumatic.        Comments: Small area of draining superficial infection with surrounding erythema and mild swelling  Findings concerning for cellulitis  Eyes:      General: Vision grossly intact. Gaze aligned appropriately.      Extraocular Movements: Extraocular movements intact.      Conjunctiva/sclera: Conjunctivae normal.      Pupils: Pupils are equal, round, and reactive to light.   Cardiovascular:      Rate and Rhythm: Normal rate and regular rhythm.      Heart sounds: No murmur heard.  Pulmonary:      Effort: Pulmonary effort is normal. No respiratory distress.      Breath sounds: Normal breath sounds.   Abdominal:      Palpations: Abdomen is soft.      Tenderness: There is no abdominal tenderness.   Musculoskeletal:         General: No swelling.      Cervical back: Neck supple.   Skin:     General: Skin is warm and dry.      Capillary Refill: Capillary refill takes less than 2 seconds.      Findings: Erythema present.   Neurological:      Mental Status: She is alert.   Psychiatric:         Mood and Affect: Mood normal.           ED Course & MDM   Diagnoses as of 10/15/24 1739   Facial cellulitis                 No data recorded                                 Medical Decision Making  Pt upon arrival to the ED appeared to be anxious and uncomfortable but in no distress with stable vitals.  Discussed with pt the presenting complaints and clinically findings.  Reviewed with pt the epic chart and counseled the patient on skin infections and appropriate approach to management/treatments.  After assessment and evaluation findings of his exam appear to be consistent with a facial cellulitis from a infected hair follicle in the eyebrow on the right side.  At this time the area was cleaned and patient given oral antibiotics in the ED.  Patient is also given prescription for home and educated as appropriate use and encouraged her to follow-up with her primary care doctor which she states she  will be able to see on Friday for reassessment and reevaluation.  Patient also encouraged return to ED she feels the area continues to worsen to allow for further evaluation and possible admission for IV antibiotics.  Patient agreeable with plan of care and patient stable this time and discharged home.      Amount and/or Complexity of Data Reviewed  External Data Reviewed: labs, radiology and notes.    Risk  Prescription drug management.        Procedure  Procedures     Robert Freitas MD  10/15/24 [id number]

## 2024-10-16 PROBLEM — F43.10 POSTTRAUMATIC STRESS DISORDER: Status: ACTIVE | Noted: 2021-05-25

## 2024-10-16 PROBLEM — H92.03 OTALGIA OF BOTH EARS: Status: ACTIVE | Noted: 2024-10-16

## 2024-10-16 PROBLEM — S05.8X9A: Status: ACTIVE | Noted: 2024-10-16

## 2024-10-16 PROBLEM — R76.8 POSITIVE ANTINUCLEAR ANTIBODY: Status: ACTIVE | Noted: 2024-10-16

## 2024-10-16 PROBLEM — F32.A DEPRESSIVE DISORDER: Status: ACTIVE | Noted: 2021-05-25

## 2024-10-16 PROBLEM — K59.03 DRUG-INDUCED CONSTIPATION: Status: ACTIVE | Noted: 2024-10-04

## 2024-10-16 PROBLEM — H02.824 CYST OF LEFT UPPER EYELID: Status: ACTIVE | Noted: 2024-10-16

## 2024-10-16 PROBLEM — K04.7 INFECTION OF TOOTH: Status: ACTIVE | Noted: 2024-10-16

## 2024-10-16 PROBLEM — E66.9 OBESITY DUE TO ENERGY IMBALANCE: Status: ACTIVE | Noted: 2024-10-04

## 2024-10-21 ENCOUNTER — HOSPITAL ENCOUNTER (EMERGENCY)
Facility: HOSPITAL | Age: 44
Discharge: HOME | End: 2024-10-21
Attending: EMERGENCY MEDICINE
Payer: COMMERCIAL

## 2024-10-21 VITALS
TEMPERATURE: 97.8 F | WEIGHT: 183 LBS | OXYGEN SATURATION: 97 % | HEIGHT: 62 IN | DIASTOLIC BLOOD PRESSURE: 87 MMHG | RESPIRATION RATE: 17 BRPM | HEART RATE: 97 BPM | SYSTOLIC BLOOD PRESSURE: 107 MMHG | BODY MASS INDEX: 33.68 KG/M2

## 2024-10-21 DIAGNOSIS — J45.20 MILD INTERMITTENT ASTHMA WITHOUT COMPLICATION (HHS-HCC): Primary | ICD-10-CM

## 2024-10-21 DIAGNOSIS — J06.9 VIRAL UPPER RESPIRATORY TRACT INFECTION: ICD-10-CM

## 2024-10-21 PROCEDURE — 99281 EMR DPT VST MAYX REQ PHY/QHP: CPT

## 2024-10-21 PROCEDURE — 99283 EMERGENCY DEPT VISIT LOW MDM: CPT

## 2024-10-21 RX ORDER — IBUPROFEN 600 MG/1
600 TABLET ORAL EVERY 8 HOURS PRN
Qty: 30 TABLET | Refills: 0 | Status: SHIPPED | OUTPATIENT
Start: 2024-10-21

## 2024-10-21 RX ORDER — METHYLPREDNISOLONE 4 MG/1
TABLET ORAL
Qty: 21 TABLET | Refills: 0 | Status: SHIPPED | OUTPATIENT
Start: 2024-10-21

## 2024-10-21 ASSESSMENT — PAIN - FUNCTIONAL ASSESSMENT: PAIN_FUNCTIONAL_ASSESSMENT: 0-10

## 2024-10-21 ASSESSMENT — PAIN SCALES - GENERAL: PAINLEVEL_OUTOF10: 0 - NO PAIN

## 2024-10-21 NOTE — ED PROVIDER NOTES
Department of Emergency Medicine   ED  Provider Note  Admit Date/RoomTime: 10/21/2024 10:37 AM  ED Room: 10/10                  History of Present Illness:   Radha Ellsworth is a 43 y.o. female presenting to the ED for cough and congestion, beginning last 3 or 4 days.  The complaint has been persistent, moderate in severity, and worsened by nothing.  Patient complains of some nasal congestion and cough that is been minimally productive.  She has history of COPD/asthma/reactive airways and says she feels like she needs a steroid pack.  She denies any laila fever or chills.  No chest pain.  No significant shortness of breath.  No leg pain swelling or calf tenderness.      Review of Systems:   Pertinent positives and review of systems as noted above.  Remaining 10 review of systems is negative or noncontributory to today's episode of care.  Review of Systems   A complete review of systems is otherwise negative except as noted above    --------------------------------------------- PAST HISTORY ---------------------------------------------  Past Medical History:  has a past medical history of Asperger's syndrome and Unspecified asthma, uncomplicated ([facility]) (10/23/2017).    Past Surgical History:  has a past surgical history that includes  section, classic (2014) and Hand surgery (2014).    Social History:  reports that she has been smoking cigarettes. She has never used smokeless tobacco. She reports current drug use. Drug: Marijuana. She reports that she does not drink alcohol.    Family History: family history is not on file. Unless otherwise noted, family history is non contributory    Patient's Medications   New Prescriptions    IBUPROFEN 600 MG TABLET    Take 1 tablet (600 mg) by mouth every 8 hours if needed for mild pain (1 - 3) or fever (temp greater than 38.0 C).   Previous Medications    ALBUTEROL 90 MCG/ACTUATION INHALER    Inhale 2 puffs every 4 hours if needed for wheezing.     ALBUTEROL 90 MCG/ACTUATION INHALER    Inhale 2 puffs every 4 hours if needed for wheezing.    AZITHROMYCIN (ZITHROMAX Z-PATTIE) 250 MG TABLET    Take 1 tablet (250 mg) by mouth.    BLOOD SUGAR DIAGNOSTIC STRIP    1 each twice a day.    CETIRIZINE (ZYRTEC) 10 MG TABLET    Take by mouth.    CLINDAMYCIN (CLEOCIN) 150 MG CAPSULE    Take by mouth every 6 hours.    CLOBETASOL (TEMOVATE) 0.05 % EXTERNAL SOLUTION    apply 1 APPLICATION to affected area twice a day    COMBIVENT RESPIMAT  MCG/ACTUATION INHALER    inhale 2 puffs as directed twice a day if needed for wheezing or shortness of breath    CYCLOBENZAPRINE (FLEXERIL) 10 MG TABLET    1 tablet (10 mg).    DICLOFENAC SODIUM 3 % GEL    Apply topically.    DOCUSATE SODIUM (COLACE) 100 MG CAPSULE    1 capsule (100 mg).    DOXYCYCLINE (VIBRAMYCIN) 100 MG CAPSULE    Take 1 capsule (100 mg) by mouth 2 times a day for 7 days. Take with at least 8 ounces (large glass) of water, do not lie down for 30 minutes after    FAMOTIDINE (PEPCID) 20 MG TABLET    Take 1 tablet (20 mg) by mouth 2 times a day for 15 days.    IPRATROPIUM (ATROVENT HFA) 17 MCG/ACTUATION INHALER    Use 2 puffs daily.    KETOROLAC (TORADOL) 10 MG TABLET    1 tablet (10 mg).    LEVOFLOXACIN (LEVAQUIN) 750 MG TABLET    Take 1 tablet (750 mg) by mouth once daily.    LIDOCAINE (LIDODERM) 5 % PATCH    Place 1 patch over 12 hours on the skin once daily. Remove & discard patch within 12 hours or as directed by MD.    LORATADINE (CLARITIN) 10 MG TABLET    Take 1 tablet (10 mg) by mouth.    METHOCARBAMOL (ROBAXIN) 500 MG TABLET    Take 1 tablet (500 mg) by mouth 3 times a day for 7 days. As needed for pain    MOMETASONE-FORMOTEROL (DULERA 50) 50-5 MCG/ACTUATION HFA AEROSOL INHALER INHALER    Inhale 2 puffs twice a day.    MONTELUKAST (SINGULAIR) 10 MG TABLET    Take 1 tab(s) orally once a day (in the evening)    NAPROXEN-CAPSICUM OLEORESIN 500 MG- 0.025 % KIT    Take by mouth.    ONDANSETRON ODT (ZOFRAN-ODT) 4 MG  "DISINTEGRATING TABLET    1 tablet (4 mg).    PERMETHRIN (ELIMITE) 5 % CREAM    Apply topically.    POLYETHYLENE GLYCOL (GLYCOLAX, MIRALAX) 17 GRAM/DOSE POWDER    Mix 17 g of powder and drink.    PREDNISONE (DELTASONE) 10 MG TABLET    Take 1 tablet (10 mg) by mouth. Take with food.    TOBRAMYCIN (TOBREX) 0.3 % OPHTHALMIC SOLUTION        TRUE METRIX GLUCOSE METER MISC    as needed for blood glucose monitoring    TRUEPLUS LANCETS 28 GAUGE        TRULICITY 0.75 MG/0.5 ML PEN INJECTOR    0.75 mg.   Modified Medications    Modified Medication Previous Medication    METHYLPREDNISOLONE (MEDROL DOSPAK) 4 MG TABLETS methylPREDNISolone (Medrol Dospak) 4 mg tablets       Follow schedule on package instructions    Take medication according to the instructions on the label   Discontinued Medications    IBUPROFEN 800 MG TABLET    Take 1 tablet (800 mg) by mouth every 8 hours if needed.      The patient’s home medications have been reviewed.    Allergies: Sulfa (sulfonamide antibiotics) and Sulfamethoxazole-trimethoprim    -------------------------------------------------- RESULTS -------------------------------------------------  All laboratory and radiology results have been personally reviewed by myself   LABS:  Labs Reviewed - No data to display      RADIOLOGY:  Interpreted by Radiologist.  No orders to display       No results found for this or any previous visit (from the past 4464 hours).  ------------------------- NURSING NOTES AND VITALS REVIEWED ---------------------------   The nursing notes within the ED encounter and vital signs as below have been reviewed.   /87   Pulse 97   Temp 36.6 °C (97.8 °F)   Resp 17   Ht 1.575 m (5' 2\")   Wt 83 kg (183 lb)   SpO2 97%   BMI 33.47 kg/m²   Oxygen Saturation Interpretation: Normal      ---------------------------------------------------PHYSICAL EXAM--------------------------------------  Physical Exam  Vitals and nursing note reviewed.   Constitutional:       General: " She is not in acute distress.     Appearance: She is well-developed. She is not ill-appearing or toxic-appearing.   HENT:      Head: Normocephalic and atraumatic.      Right Ear: Tympanic membrane, ear canal and external ear normal.      Left Ear: Tympanic membrane, ear canal and external ear normal.      Nose: Congestion present. No rhinorrhea.      Mouth/Throat:      Mouth: Mucous membranes are moist.      Pharynx: Oropharynx is clear. No oropharyngeal exudate or posterior oropharyngeal erythema.   Eyes:      General: No scleral icterus.     Extraocular Movements: Extraocular movements intact.      Conjunctiva/sclera: Conjunctivae normal.      Pupils: Pupils are equal, round, and reactive to light.   Cardiovascular:      Rate and Rhythm: Normal rate and regular rhythm.      Pulses: Normal pulses.      Heart sounds: Normal heart sounds. No murmur heard.  Pulmonary:      Effort: Pulmonary effort is normal. No respiratory distress.      Breath sounds: Normal breath sounds. No wheezing, rhonchi or rales.   Abdominal:      General: Bowel sounds are normal. There is no distension.      Palpations: Abdomen is soft.      Tenderness: There is no abdominal tenderness. There is no right CVA tenderness, left CVA tenderness or guarding.   Musculoskeletal:         General: No swelling or tenderness.      Cervical back: Normal range of motion and neck supple. No rigidity or tenderness.      Right lower leg: No edema.      Left lower leg: No edema.   Lymphadenopathy:      Cervical: No cervical adenopathy.   Skin:     General: Skin is warm and dry.      Capillary Refill: Capillary refill takes less than 2 seconds.      Findings: No rash.   Neurological:      Mental Status: She is alert and oriented to person, place, and time.   Psychiatric:         Mood and Affect: Mood normal.            Procedures  None  ------------------------------ ED COURSE/MEDICAL DECISION MAKING----------------------    Medical Decision Making:   Patient  was seen and examined by me.  Patient is requesting a prednisone pack and some ibuprofen for aches and pain.  She denies any significant fever or chills.  She denies any neck pain or stiffness.  No significant sore throat.  She has had a cough and nonproductive.  She has had some nasal congestion.  Recently on doxycycline for cellulitis over the right forehead which has resolved.    Diagnoses as of 10/21/24 1121   Mild intermittent asthma without complication ([facility])   Viral upper respiratory tract infection      Counseling:   The emergency provider has spoken with the patient and discussed today’s results, in addition to providing specific details for the plan of care and counseling regarding the diagnosis and prognosis.  Questions are answered at this time and they are agreeable with the plan.      --------------------------------- IMPRESSION AND DISPOSITION ---------------------------------        IMPRESSION  1. Mild intermittent asthma without complication ([facility])    2. Viral upper respiratory tract infection        DISPOSITION  Disposition: Discharge to home  Patient condition is good      Billing Provider Critical Care Time: 0 minutes     Brad Ley,   10/21/24 1121

## 2024-10-22 ENCOUNTER — APPOINTMENT (OUTPATIENT)
Dept: DERMATOLOGY | Facility: CLINIC | Age: 44
End: 2024-10-22
Payer: COMMERCIAL

## 2024-11-06 ENCOUNTER — APPOINTMENT (OUTPATIENT)
Dept: RADIOLOGY | Facility: HOSPITAL | Age: 44
End: 2024-11-06
Payer: COMMERCIAL

## 2024-11-06 ENCOUNTER — HOSPITAL ENCOUNTER (EMERGENCY)
Facility: HOSPITAL | Age: 44
Discharge: HOME | End: 2024-11-06
Attending: EMERGENCY MEDICINE
Payer: COMMERCIAL

## 2024-11-06 ENCOUNTER — APPOINTMENT (OUTPATIENT)
Dept: CARDIOLOGY | Facility: HOSPITAL | Age: 44
End: 2024-11-06
Payer: COMMERCIAL

## 2024-11-06 VITALS
HEART RATE: 90 BPM | HEIGHT: 62 IN | DIASTOLIC BLOOD PRESSURE: 98 MMHG | SYSTOLIC BLOOD PRESSURE: 124 MMHG | RESPIRATION RATE: 19 BRPM | WEIGHT: 184.75 LBS | BODY MASS INDEX: 34 KG/M2 | TEMPERATURE: 98.1 F

## 2024-11-06 DIAGNOSIS — J40 BRONCHITIS: Primary | ICD-10-CM

## 2024-11-06 LAB
ALBUMIN SERPL BCP-MCNC: 3.9 G/DL (ref 3.4–5)
ALP SERPL-CCNC: 45 U/L (ref 33–110)
ALT SERPL W P-5'-P-CCNC: 18 U/L (ref 7–45)
ANION GAP BLDV CALCULATED.4IONS-SCNC: 9 MMOL/L (ref 10–25)
ANION GAP SERPL CALC-SCNC: 8 MMOL/L (ref 10–20)
AST SERPL W P-5'-P-CCNC: 12 U/L (ref 9–39)
ATRIAL RATE: 88 BPM
BASE EXCESS BLDV CALC-SCNC: 1.2 MMOL/L (ref -2–3)
BASOPHILS # BLD AUTO: 0.05 X10*3/UL (ref 0–0.1)
BASOPHILS NFR BLD AUTO: 0.5 %
BILIRUB SERPL-MCNC: 0.3 MG/DL (ref 0–1.2)
BODY TEMPERATURE: ABNORMAL
BUN SERPL-MCNC: 10 MG/DL (ref 6–23)
CA-I BLDV-SCNC: 1.26 MMOL/L (ref 1.1–1.33)
CALCIUM SERPL-MCNC: 9.3 MG/DL (ref 8.6–10.3)
CHLORIDE BLDV-SCNC: 105 MMOL/L (ref 98–107)
CHLORIDE SERPL-SCNC: 105 MMOL/L (ref 98–107)
CO2 SERPL-SCNC: 28 MMOL/L (ref 21–32)
CREAT SERPL-MCNC: 0.69 MG/DL (ref 0.5–1.05)
D DIMER PPP FEU-MCNC: 747 NG/ML FEU
EGFRCR SERPLBLD CKD-EPI 2021: >90 ML/MIN/1.73M*2
EOSINOPHIL # BLD AUTO: 0.16 X10*3/UL (ref 0–0.7)
EOSINOPHIL NFR BLD AUTO: 1.4 %
ERYTHROCYTE [DISTWIDTH] IN BLOOD BY AUTOMATED COUNT: 12.6 % (ref 11.5–14.5)
FLUAV RNA RESP QL NAA+PROBE: NOT DETECTED
FLUBV RNA RESP QL NAA+PROBE: NOT DETECTED
GLUCOSE BLDV-MCNC: 106 MG/DL (ref 74–99)
GLUCOSE SERPL-MCNC: 109 MG/DL (ref 74–99)
HCO3 BLDV-SCNC: 26.6 MMOL/L (ref 22–26)
HCT VFR BLD AUTO: 41.1 % (ref 36–46)
HCT VFR BLD EST: 43 % (ref 36–46)
HGB BLD-MCNC: 13.8 G/DL (ref 12–16)
HGB BLDV-MCNC: 14.4 G/DL (ref 12–16)
HOLD SPECIMEN: NORMAL
HOLD SPECIMEN: NORMAL
IMM GRANULOCYTES # BLD AUTO: 0.03 X10*3/UL (ref 0–0.7)
IMM GRANULOCYTES NFR BLD AUTO: 0.3 % (ref 0–0.9)
INHALED O2 CONCENTRATION: 21 %
LACTATE BLDV-SCNC: 1.2 MMOL/L (ref 0.4–2)
LYMPHOCYTES # BLD AUTO: 1.29 X10*3/UL (ref 1.2–4.8)
LYMPHOCYTES NFR BLD AUTO: 11.6 %
MCH RBC QN AUTO: 27.8 PG (ref 26–34)
MCHC RBC AUTO-ENTMCNC: 33.6 G/DL (ref 32–36)
MCV RBC AUTO: 83 FL (ref 80–100)
MONOCYTES # BLD AUTO: 0.19 X10*3/UL (ref 0.1–1)
MONOCYTES NFR BLD AUTO: 1.7 %
NEUTROPHILS # BLD AUTO: 9.36 X10*3/UL (ref 1.2–7.7)
NEUTROPHILS NFR BLD AUTO: 84.5 %
NRBC BLD-RTO: 0 /100 WBCS (ref 0–0)
OXYHGB MFR BLDV: 70.4 % (ref 45–75)
P AXIS: 69 DEGREES
P OFFSET: 199 MS
P ONSET: 153 MS
PCO2 BLDV: 44 MM HG (ref 41–51)
PH BLDV: 7.39 PH (ref 7.33–7.43)
PLATELET # BLD AUTO: 300 X10*3/UL (ref 150–450)
PO2 BLDV: 43 MM HG (ref 35–45)
POTASSIUM BLDV-SCNC: 4.4 MMOL/L (ref 3.5–5.3)
POTASSIUM SERPL-SCNC: 4.3 MMOL/L (ref 3.5–5.3)
PR INTERVAL: 138 MS
PROT SERPL-MCNC: 6.4 G/DL (ref 6.4–8.2)
Q ONSET: 222 MS
QRS COUNT: 14 BEATS
QRS DURATION: 76 MS
QT INTERVAL: 352 MS
QTC CALCULATION(BAZETT): 425 MS
QTC FREDERICIA: 400 MS
R AXIS: 78 DEGREES
RBC # BLD AUTO: 4.96 X10*6/UL (ref 4–5.2)
SAO2 % BLDV: 73 % (ref 45–75)
SODIUM BLDV-SCNC: 136 MMOL/L (ref 136–145)
SODIUM SERPL-SCNC: 137 MMOL/L (ref 136–145)
T AXIS: 76 DEGREES
T OFFSET: 398 MS
VENTRICULAR RATE: 88 BPM
WBC # BLD AUTO: 11.1 X10*3/UL (ref 4.4–11.3)

## 2024-11-06 PROCEDURE — 87502 INFLUENZA DNA AMP PROBE: CPT | Performed by: EMERGENCY MEDICINE

## 2024-11-06 PROCEDURE — 71275 CT ANGIOGRAPHY CHEST: CPT

## 2024-11-06 PROCEDURE — 71045 X-RAY EXAM CHEST 1 VIEW: CPT

## 2024-11-06 PROCEDURE — 2500000002 HC RX 250 W HCPCS SELF ADMINISTERED DRUGS (ALT 637 FOR MEDICARE OP, ALT 636 FOR OP/ED): Mod: SE | Performed by: EMERGENCY MEDICINE

## 2024-11-06 PROCEDURE — 71275 CT ANGIOGRAPHY CHEST: CPT | Performed by: RADIOLOGY

## 2024-11-06 PROCEDURE — 96374 THER/PROPH/DIAG INJ IV PUSH: CPT

## 2024-11-06 PROCEDURE — 84132 ASSAY OF SERUM POTASSIUM: CPT | Mod: 59 | Performed by: EMERGENCY MEDICINE

## 2024-11-06 PROCEDURE — 85379 FIBRIN DEGRADATION QUANT: CPT | Performed by: EMERGENCY MEDICINE

## 2024-11-06 PROCEDURE — 99285 EMERGENCY DEPT VISIT HI MDM: CPT | Mod: 25

## 2024-11-06 PROCEDURE — 2550000001 HC RX 255 CONTRASTS: Mod: SE | Performed by: EMERGENCY MEDICINE

## 2024-11-06 PROCEDURE — 36415 COLL VENOUS BLD VENIPUNCTURE: CPT | Performed by: EMERGENCY MEDICINE

## 2024-11-06 PROCEDURE — 2500000004 HC RX 250 GENERAL PHARMACY W/ HCPCS (ALT 636 FOR OP/ED): Mod: SE

## 2024-11-06 PROCEDURE — 85025 COMPLETE CBC W/AUTO DIFF WBC: CPT | Performed by: EMERGENCY MEDICINE

## 2024-11-06 PROCEDURE — 9420000001 HC RT PATIENT EDUCATION 5 MIN

## 2024-11-06 PROCEDURE — 71045 X-RAY EXAM CHEST 1 VIEW: CPT | Performed by: RADIOLOGY

## 2024-11-06 PROCEDURE — 94640 AIRWAY INHALATION TREATMENT: CPT

## 2024-11-06 PROCEDURE — 84132 ASSAY OF SERUM POTASSIUM: CPT | Performed by: EMERGENCY MEDICINE

## 2024-11-06 PROCEDURE — 2500000001 HC RX 250 WO HCPCS SELF ADMINISTERED DRUGS (ALT 637 FOR MEDICARE OP): Mod: SE

## 2024-11-06 PROCEDURE — 93005 ELECTROCARDIOGRAM TRACING: CPT

## 2024-11-06 PROCEDURE — 94664 DEMO&/EVAL PT USE INHALER: CPT | Mod: 59

## 2024-11-06 RX ORDER — IPRATROPIUM BROMIDE AND ALBUTEROL SULFATE 2.5; .5 MG/3ML; MG/3ML
SOLUTION RESPIRATORY (INHALATION)
Status: DISCONTINUED
Start: 2024-11-06 | End: 2024-11-06 | Stop reason: HOSPADM

## 2024-11-06 RX ORDER — AZITHROMYCIN 500 MG/1
500 TABLET, FILM COATED ORAL DAILY
Qty: 5 TABLET | Refills: 0 | Status: SHIPPED | OUTPATIENT
Start: 2024-11-06 | End: 2024-11-06 | Stop reason: ALTCHOICE

## 2024-11-06 RX ORDER — IPRATROPIUM BROMIDE AND ALBUTEROL SULFATE 2.5; .5 MG/3ML; MG/3ML
3 SOLUTION RESPIRATORY (INHALATION)
Status: DISCONTINUED | OUTPATIENT
Start: 2024-11-06 | End: 2024-11-06

## 2024-11-06 RX ORDER — IBUPROFEN 600 MG/1
TABLET ORAL
Status: COMPLETED
Start: 2024-11-06 | End: 2024-11-06

## 2024-11-06 RX ORDER — AMOXICILLIN AND CLAVULANATE POTASSIUM 875; 125 MG/1; MG/1
1 TABLET, FILM COATED ORAL EVERY 12 HOURS
Qty: 14 TABLET | Refills: 0 | Status: SHIPPED | OUTPATIENT
Start: 2024-11-06 | End: 2024-11-13

## 2024-11-06 RX ORDER — ALBUTEROL SULFATE 90 UG/1
1-2 INHALANT RESPIRATORY (INHALATION) EVERY 6 HOURS PRN
Qty: 18 G | Refills: 0 | Status: SHIPPED | OUTPATIENT
Start: 2024-11-06 | End: 2024-12-06

## 2024-11-06 RX ORDER — IPRATROPIUM BROMIDE AND ALBUTEROL SULFATE 2.5; .5 MG/3ML; MG/3ML
3 SOLUTION RESPIRATORY (INHALATION) ONCE
Status: COMPLETED | OUTPATIENT
Start: 2024-11-06 | End: 2024-11-06

## 2024-11-06 RX ORDER — IBUPROFEN 600 MG/1
600 TABLET ORAL ONCE
Status: COMPLETED | OUTPATIENT
Start: 2024-11-06 | End: 2024-11-06

## 2024-11-06 ASSESSMENT — COLUMBIA-SUICIDE SEVERITY RATING SCALE - C-SSRS
2. HAVE YOU ACTUALLY HAD ANY THOUGHTS OF KILLING YOURSELF?: NO
1. IN THE PAST MONTH, HAVE YOU WISHED YOU WERE DEAD OR WISHED YOU COULD GO TO SLEEP AND NOT WAKE UP?: NO
6. HAVE YOU EVER DONE ANYTHING, STARTED TO DO ANYTHING, OR PREPARED TO DO ANYTHING TO END YOUR LIFE?: NO

## 2024-11-06 ASSESSMENT — PAIN - FUNCTIONAL ASSESSMENT: PAIN_FUNCTIONAL_ASSESSMENT: 0-10

## 2024-11-06 ASSESSMENT — PAIN DESCRIPTION - PAIN TYPE: TYPE: ACUTE PAIN

## 2024-11-06 ASSESSMENT — PAIN SCALES - GENERAL
PAINLEVEL_OUTOF10: 0 - NO PAIN
PAINLEVEL_OUTOF10: 6

## 2024-11-06 NOTE — DISCHARGE INSTRUCTIONS
Zithromax and albuterol metered-dose inhaler as prescribed.    Do your best to stop smoking.    Follow-up with her primary care doctor in 1 week for recheck.    Return for worsening symptoms or concerns.

## 2024-11-06 NOTE — ED TRIAGE NOTES
Pt arrived with cough. States right lower abdomen with cough only. Also states has tightening in neck and does go into her chest. Happened before arrival.

## 2024-11-06 NOTE — ED PROVIDER NOTES
[facility]   ED  Provider Note  2024 10:55 AM  AC11/AC11      Chief Complaint   Patient presents with    Cough     Pt c/o cough x few days with no relief from albuterol inhaler, took 4mg prednisone today and yesterday from meds she had on hand, denies fever, vomiting, diarrhea, endorses nausea and chills        History of Present Illness:   Radha Ellsworth is a 43 y.o. female presenting to the ED for Cough, beginning Last week.  The complaint has been intermittent, moderate in severity, and worsened by light exertion.  Intermittent patient reports she has been coughing for little over a week.  She has some sharp right sided rib and lateral abdominal wall pain that worsens with coughing and movement.  She denies any leg pain or swelling.  She has a history of DVT or PE.  She has been taking albuterol and took another dose of her Medrol Dosepak yesterday.  She denies any known COVID exposure.  She has had no fever or chills.  She has clear sputum production.  She has no chest pain at rest.  She does not feel short of breath.  She has had some nausea and chills.      Review of Systems:   Pertinent positives and review of systems as noted above.  Remaining 10 review of systems is negative or noncontributory to today's episode of care.  Review of Systems       --------------------------------------------- PAST HISTORY ---------------------------------------------  Past Medical History:   Past Medical History:   Diagnosis Date    Asperger's syndrome     COPD (chronic obstructive pulmonary disease) ([facility])     Unspecified asthma, uncomplicated ([facility]) 10/23/2017    Asthma        Past Surgical History:   Past Surgical History:   Procedure Laterality Date     SECTION, CLASSIC  2014     Section    HAND SURGERY  2014    Hand Surgery                                                                                                                                                               Social History:   Social History     Social History Narrative    Not on file        Family History: family history is not on file. Unless otherwise noted, family history is non contributory    Patient's Medications   New Prescriptions    No medications on file   Previous Medications    ALBUTEROL 90 MCG/ACTUATION INHALER    Inhale 2 puffs every 4 hours if needed for wheezing.    ALBUTEROL 90 MCG/ACTUATION INHALER    Inhale 2 puffs every 4 hours if needed for wheezing.    AZITHROMYCIN (ZITHROMAX Z-PATTIE) 250 MG TABLET    Take 1 tablet (250 mg) by mouth.    BLOOD SUGAR DIAGNOSTIC STRIP    1 each twice a day.    CETIRIZINE (ZYRTEC) 10 MG TABLET    Take by mouth.    CLINDAMYCIN (CLEOCIN) 150 MG CAPSULE    Take by mouth every 6 hours.    CLOBETASOL (TEMOVATE) 0.05 % EXTERNAL SOLUTION    apply 1 APPLICATION to affected area twice a day    COMBIVENT RESPIMAT  MCG/ACTUATION INHALER    inhale 2 puffs as directed twice a day if needed for wheezing or shortness of breath    CYCLOBENZAPRINE (FLEXERIL) 10 MG TABLET    1 tablet (10 mg).    DICLOFENAC SODIUM 3 % GEL    Apply topically.    DOCUSATE SODIUM (COLACE) 100 MG CAPSULE    1 capsule (100 mg).    FAMOTIDINE (PEPCID) 20 MG TABLET    Take 1 tablet (20 mg) by mouth 2 times a day for 15 days.    IBUPROFEN 600 MG TABLET    Take 1 tablet (600 mg) by mouth every 8 hours if needed for mild pain (1 - 3) or fever (temp greater than 38.0 C).    IPRATROPIUM (ATROVENT HFA) 17 MCG/ACTUATION INHALER    Use 2 puffs daily.    KETOROLAC (TORADOL) 10 MG TABLET    1 tablet (10 mg).    LEVOFLOXACIN (LEVAQUIN) 750 MG TABLET    Take 1 tablet (750 mg) by mouth once daily.    LIDOCAINE (LIDODERM) 5 % PATCH    Place 1 patch over 12 hours on the skin once daily. Remove & discard patch within 12 hours or as directed by MD.    LORATADINE (CLARITIN) 10 MG TABLET    Take 1 tablet (10 mg) by mouth.    METHOCARBAMOL (ROBAXIN) 500 MG TABLET    Take 1 tablet (500 mg) by mouth 3 times a day for 7 days.  As needed for pain    METHYLPREDNISOLONE (MEDROL DOSPAK) 4 MG TABLETS    Follow schedule on package instructions    MOMETASONE-FORMOTEROL (DULERA 50) 50-5 MCG/ACTUATION HFA AEROSOL INHALER INHALER    Inhale 2 puffs twice a day.    MONTELUKAST (SINGULAIR) 10 MG TABLET    Take 1 tab(s) orally once a day (in the evening)    NAPROXEN-CAPSICUM OLEORESIN 500 MG- 0.025 % KIT    Take by mouth.    ONDANSETRON ODT (ZOFRAN-ODT) 4 MG DISINTEGRATING TABLET    1 tablet (4 mg).    PERMETHRIN (ELIMITE) 5 % CREAM    Apply topically.    POLYETHYLENE GLYCOL (GLYCOLAX, MIRALAX) 17 GRAM/DOSE POWDER    Mix 17 g of powder and drink.    PREDNISONE (DELTASONE) 10 MG TABLET    Take 1 tablet (10 mg) by mouth. Take with food.    TOBRAMYCIN (TOBREX) 0.3 % OPHTHALMIC SOLUTION        TRUE METRIX GLUCOSE METER MISC    as needed for blood glucose monitoring    TRUEPLUS LANCETS 28 GAUGE        TRULICITY 0.75 MG/0.5 ML PEN INJECTOR    0.75 mg.   Modified Medications    No medications on file   Discontinued Medications    No medications on file      The patient’s home medications have been reviewed.    Allergies: Sulfa (sulfonamide antibiotics) and Sulfamethoxazole-trimethoprim    -------------------------------------------------- RESULTS -------------------------------------------------  All laboratory and radiology results have been personally reviewed by myself   LABS:  Labs Reviewed   BLOOD GAS VENOUS FULL PANEL - Abnormal       Result Value    POCT pH, Venous 7.39      POCT pCO2, Venous 44      POCT pO2, Venous 43      POCT SO2, Venous 73      POCT Oxy Hemoglobin, Venous 70.4      POCT Hematocrit Calculated, Venous 43.0      POCT Sodium, Venous 136      POCT Potassium, Venous 4.4      POCT Chloride, Venous 105      POCT Ionized Calicum, Venous 1.26      POCT Glucose, Venous 106 (*)     POCT Lactate, Venous 1.2      POCT Base Excess, Venous 1.2      POCT HCO3 Calculated, Venous 26.6 (*)     POCT Hemoglobin, Venous 14.4      POCT Anion Gap, Venous  9.0 (*)     Patient Temperature        FiO2 21     COMPREHENSIVE METABOLIC PANEL - Abnormal    Glucose 109 (*)     Sodium 137      Potassium 4.3      Chloride 105      Bicarbonate 28      Anion Gap 8 (*)     Urea Nitrogen 10      Creatinine 0.69      eGFR >90      Calcium 9.3      Albumin 3.9      Alkaline Phosphatase 45      Total Protein 6.4      AST 12      Bilirubin, Total 0.3      ALT 18     CBC WITH AUTO DIFFERENTIAL - Abnormal    WBC 11.1      nRBC 0.0      RBC 4.96      Hemoglobin 13.8      Hematocrit 41.1      MCV 83      MCH 27.8      MCHC 33.6      RDW 12.6      Platelets 300      Neutrophils % 84.5      Immature Granulocytes %, Automated 0.3      Lymphocytes % 11.6      Monocytes % 1.7      Eosinophils % 1.4      Basophils % 0.5      Neutrophils Absolute 9.36 (*)     Immature Granulocytes Absolute, Automated 0.03      Lymphocytes Absolute 1.29      Monocytes Absolute 0.19      Eosinophils Absolute 0.16      Basophils Absolute 0.05     D-DIMER, NON VTE - Abnormal    D-Dimer Non VTE, Quant (ng/mL FEU) 747 (*)     Narrative:     The D-Dimer assay is reported in ng/mL Fibrinogen Equivalent Units (FEU). The results of this assay should NOT be used for the exclusion of Deep Vein Thrombosis and/or Pulmonary Embolism.   INFLUENZA A AND B PCR - Normal    Flu A Result Not Detected      Flu B Result Not Detected      Narrative:     This assay is an in vitro diagnostic multiplex nucleic acid amplification test for the detection and discrimination of Influenza A & B from nasopharyngeal specimens, and has been validated for use at [facility]. Negative results do not preclude Influenza A/B infections, and should not be used as the sole basis for diagnosis, treatment, or other management decisions. If Influenza A/B and RSV PCR results are negative, testing for Parainfluenza virus, Adenovirus and Metapneumovirus is routinely performed for [facility] [place] [facility] pediatric oncology and intensive care inpatients, and  "is available on other patients by placing an add-on request.   GRAY TOP    Extra Tube Hold for add-ons.       EKG: Sinus rhythm 88 bpm, normal axis, normal intervals, no acute ST elevations.  Interpreted by BOLIVAR Mckeon MD    RADIOLOGY:  Interpreted by Radiologist.  CT angio chest for pulmonary embolism   Final Result   1.  No pulmonary embolism identified.   2. Endobronchial mucous plugging involving the left upper lobe,   otherwise no active cardiopulmonary disease.             Signed by: Satnam Cope 11/6/2024 2:41 PM   Dictation workstation:   [id number]      XR chest 1 view   Final Result   No acute cardiopulmonary disease.        Signed by: Valdo Casey 11/6/2024 11:50 AM   Dictation workstation:   [id number]          No results found for this or any previous visit (from the past 4464 hours).  ------------------------- NURSING NOTES AND VITALS REVIEWED ---------------------------   The nursing notes within the ED encounter and vital signs as below have been reviewed.   /86   Pulse 94   Temp 36.7 °C (98.1 °F) (Temporal)   Resp 20   Ht 1.575 m (5' 2\")   Wt 83.8 kg (184 lb 11.9 oz)   BMI 33.79 kg/m²   Oxygen Saturation Interpretation: Normal      ---------------------------------------------------PHYSICAL EXAM--------------------------------------  Physical Exam   Constitutional/General: Alert,  well appearing, non toxic in NAD  Head: Normocephalic and atraumatic  Eyes: PERRL, EOMI, conjunctiva normal, sclera non icteric  Mouth: Oropharynx clear, handling secretions, no trismus, no asymmetry of the posterior oropharynx or uvular edema  Neck: Supple, full ROM, non tender to palpation in the midline, no stridor, no crepitus, no meningeal signs  Respiratory: Lungs  Scattered wheezes, rales, or rhonchi. Not in respiratory distress  Cardiovascular:  Regular rate. Regular rhythm. No murmurs, gallops, or rubs. 2+ distal pulses  Chest: No chest wall tenderness  GI:  Abdomen Soft, Non tender, Non " distended.  +BS. No organomegaly, no palpable masses,  No rebound, guarding, or rigidity.   Musculoskeletal: Moves all extremities x 4. Warm and well perfused, no clubbing, cyanosis, or edema. Capillary refill <3 seconds  Integument: skin warm and dry. No rashes.   Lymphatic: no lymphadenopathy noted  Neurologic: No focal deficits, symmetric strength 5/5 in the upper and lower extremities bilaterally  Psychiatric: Normal Affect    Procedures    ------------------------------ ED COURSE/MEDICAL DECISION MAKING----------------------  Diagnoses as of 11/06/24 4787   Bronchitis      Differential diagnosis: Pneumonia, COPD exacerbation, pneumothorax, bronchitis, viral respiratory infection      Medical Decision Making  Diagnoses as of 11/06/24 3134   Bronchitis    Patient x-ray does not show pneumonia.  She has bronchial plugging on her CT scan which was negative for PE.  This is most consistent with bronchitis.  Laboratory testing was reviewed without significant other abnormalities.  She is stable for outpatient management.  She was discharged to home with prescriptions for Zithromax, albuterol and advised to stop smoking.  Counseling:   The emergency provider has spoken with the patient and discussed today’s results, in addition to providing specific details for the plan of care and counseling regarding the diagnosis and prognosis.  Questions are answered at this time and they are agreeable with the plan.      --------------------------------- IMPRESSION AND DISPOSITION ---------------------------------        IMPRESSION  1. Bronchitis        DISPOSITION  Disposition: Discharge to home  Patient condition is fair      Billing Provider Critical Care Time: 0 minutes     Devin Mckeon MD  11/06/24 [id number]

## 2024-11-15 ENCOUNTER — APPOINTMENT (OUTPATIENT)
Facility: CLINIC | Age: 44
End: 2024-11-15
Payer: COMMERCIAL

## 2024-11-15 LAB
ATRIAL RATE: 88 BPM
P AXIS: 69 DEGREES
P OFFSET: 199 MS
P ONSET: 153 MS
PR INTERVAL: 138 MS
Q ONSET: 222 MS
QRS COUNT: 14 BEATS
QRS DURATION: 76 MS
QT INTERVAL: 352 MS
QTC CALCULATION(BAZETT): 425 MS
QTC FREDERICIA: 400 MS
R AXIS: 78 DEGREES
T AXIS: 76 DEGREES
T OFFSET: 398 MS
VENTRICULAR RATE: 88 BPM

## 2024-11-22 ENCOUNTER — HOSPITAL ENCOUNTER (OUTPATIENT)
Dept: RADIOLOGY | Facility: HOSPITAL | Age: 44
Discharge: HOME | End: 2024-11-22
Payer: COMMERCIAL

## 2024-11-22 ENCOUNTER — APPOINTMENT (OUTPATIENT)
Facility: CLINIC | Age: 44
End: 2024-11-22
Payer: COMMERCIAL

## 2024-11-22 ENCOUNTER — LAB (OUTPATIENT)
Dept: LAB | Facility: LAB | Age: 44
End: 2024-11-22
Payer: COMMERCIAL

## 2024-11-22 VITALS
DIASTOLIC BLOOD PRESSURE: 61 MMHG | BODY MASS INDEX: 35.96 KG/M2 | WEIGHT: 196.6 LBS | HEART RATE: 88 BPM | OXYGEN SATURATION: 99 % | SYSTOLIC BLOOD PRESSURE: 98 MMHG

## 2024-11-22 DIAGNOSIS — M25.50 POLYARTHRALGIA: ICD-10-CM

## 2024-11-22 DIAGNOSIS — G56.03 BILATERAL CARPAL TUNNEL SYNDROME: ICD-10-CM

## 2024-11-22 DIAGNOSIS — R76.8 POSITIVE ANTINUCLEAR ANTIBODY: ICD-10-CM

## 2024-11-22 DIAGNOSIS — M25.50 POLYARTHRALGIA: Primary | ICD-10-CM

## 2024-11-22 LAB
ALBUMIN SERPL BCP-MCNC: 3.7 G/DL (ref 3.4–5)
ALP SERPL-CCNC: 45 U/L (ref 33–110)
ALT SERPL W P-5'-P-CCNC: 21 U/L (ref 7–45)
ANION GAP SERPL CALC-SCNC: 11 MMOL/L (ref 10–20)
AST SERPL W P-5'-P-CCNC: 16 U/L (ref 9–39)
BASOPHILS # BLD MANUAL: 0 X10*3/UL (ref 0–0.1)
BASOPHILS NFR BLD MANUAL: 0 %
BILIRUB SERPL-MCNC: 0.3 MG/DL (ref 0–1.2)
BUN SERPL-MCNC: 14 MG/DL (ref 6–23)
CALCIUM SERPL-MCNC: 8.6 MG/DL (ref 8.6–10.3)
CHLORIDE SERPL-SCNC: 103 MMOL/L (ref 98–107)
CO2 SERPL-SCNC: 26 MMOL/L (ref 21–32)
CREAT SERPL-MCNC: 0.82 MG/DL (ref 0.5–1.05)
CRP SERPL-MCNC: 0.2 MG/DL
EGFRCR SERPLBLD CKD-EPI 2021: >90 ML/MIN/1.73M*2
EOSINOPHIL # BLD MANUAL: 0.82 X10*3/UL (ref 0–0.7)
EOSINOPHIL NFR BLD MANUAL: 4 %
ERYTHROCYTE [DISTWIDTH] IN BLOOD BY AUTOMATED COUNT: 13.1 % (ref 11.5–14.5)
ERYTHROCYTE [SEDIMENTATION RATE] IN BLOOD BY WESTERGREN METHOD: 6 MM/H (ref 0–20)
GIANT PLATELETS BLD QL SMEAR: ABNORMAL
GLUCOSE SERPL-MCNC: 102 MG/DL (ref 74–99)
HCT VFR BLD AUTO: 41.3 % (ref 36–46)
HGB BLD-MCNC: 13.5 G/DL (ref 12–16)
IMM GRANULOCYTES # BLD AUTO: 0.76 X10*3/UL (ref 0–0.7)
IMM GRANULOCYTES NFR BLD AUTO: 3.7 % (ref 0–0.9)
LYMPHOCYTES # BLD MANUAL: 5.95 X10*3/UL (ref 1.2–4.8)
LYMPHOCYTES NFR BLD MANUAL: 29 %
MCH RBC QN AUTO: 28.5 PG (ref 26–34)
MCHC RBC AUTO-ENTMCNC: 32.7 G/DL (ref 32–36)
MCV RBC AUTO: 87 FL (ref 80–100)
METAMYELOCYTES # BLD MANUAL: 0.21 X10*3/UL
METAMYELOCYTES NFR BLD MANUAL: 1 %
MONOCYTES # BLD MANUAL: 0.21 X10*3/UL (ref 0.1–1)
MONOCYTES NFR BLD MANUAL: 1 %
MYELOCYTES # BLD MANUAL: 0.21 X10*3/UL
MYELOCYTES NFR BLD MANUAL: 1 %
NEUTS SEG # BLD MANUAL: 12.3 X10*3/UL (ref 1.2–7)
NEUTS SEG NFR BLD MANUAL: 60 %
NRBC BLD-RTO: 0 /100 WBCS (ref 0–0)
PLATELET # BLD AUTO: 370 X10*3/UL (ref 150–450)
POLYCHROMASIA BLD QL SMEAR: ABNORMAL
POTASSIUM SERPL-SCNC: 3.5 MMOL/L (ref 3.5–5.3)
PROT SERPL-MCNC: 6.2 G/DL (ref 6.4–8.2)
RBC # BLD AUTO: 4.74 X10*6/UL (ref 4–5.2)
RBC MORPH BLD: ABNORMAL
SODIUM SERPL-SCNC: 136 MMOL/L (ref 136–145)
TOTAL CELLS COUNTED BLD: 100
VARIANT LYMPHS # BLD MANUAL: 0.82 X10*3/UL (ref 0–0.5)
VARIANT LYMPHS NFR BLD: 4 %
WBC # BLD AUTO: 20.5 X10*3/UL (ref 4.4–11.3)

## 2024-11-22 PROCEDURE — 82657 ENZYME CELL ACTIVITY: CPT

## 2024-11-22 PROCEDURE — 86803 HEPATITIS C AB TEST: CPT

## 2024-11-22 PROCEDURE — 86235 NUCLEAR ANTIGEN ANTIBODY: CPT

## 2024-11-22 PROCEDURE — 87340 HEPATITIS B SURFACE AG IA: CPT

## 2024-11-22 PROCEDURE — 99205 OFFICE O/P NEW HI 60 MIN: CPT | Performed by: INTERNAL MEDICINE

## 2024-11-22 PROCEDURE — 73080 X-RAY EXAM OF ELBOW: CPT | Mod: 50

## 2024-11-22 PROCEDURE — 73130 X-RAY EXAM OF HAND: CPT | Mod: 50

## 2024-11-22 PROCEDURE — 84155 ASSAY OF PROTEIN SERUM: CPT

## 2024-11-22 PROCEDURE — 86431 RHEUMATOID FACTOR QUANT: CPT

## 2024-11-22 PROCEDURE — 86160 COMPLEMENT ANTIGEN: CPT

## 2024-11-22 PROCEDURE — 86225 DNA ANTIBODY NATIVE: CPT

## 2024-11-22 PROCEDURE — 3074F SYST BP LT 130 MM HG: CPT | Performed by: INTERNAL MEDICINE

## 2024-11-22 PROCEDURE — 86200 CCP ANTIBODY: CPT

## 2024-11-22 PROCEDURE — 86038 ANTINUCLEAR ANTIBODIES: CPT

## 2024-11-22 PROCEDURE — 86140 C-REACTIVE PROTEIN: CPT

## 2024-11-22 PROCEDURE — 86704 HEP B CORE ANTIBODY TOTAL: CPT

## 2024-11-22 PROCEDURE — 83789 MASS SPECTROMETRY QUAL/QUAN: CPT

## 2024-11-22 PROCEDURE — 80053 COMPREHEN METABOLIC PANEL: CPT

## 2024-11-22 PROCEDURE — 86481 TB AG RESPONSE T-CELL SUSP: CPT

## 2024-11-22 PROCEDURE — 83516 IMMUNOASSAY NONANTIBODY: CPT

## 2024-11-22 PROCEDURE — 81001 URINALYSIS AUTO W/SCOPE: CPT

## 2024-11-22 PROCEDURE — 85652 RBC SED RATE AUTOMATED: CPT

## 2024-11-22 PROCEDURE — 84165 PROTEIN E-PHORESIS SERUM: CPT

## 2024-11-22 PROCEDURE — 85027 COMPLETE CBC AUTOMATED: CPT

## 2024-11-22 PROCEDURE — 3078F DIAST BP <80 MM HG: CPT | Performed by: INTERNAL MEDICINE

## 2024-11-22 PROCEDURE — 85007 BL SMEAR W/DIFF WBC COUNT: CPT

## 2024-11-22 PROCEDURE — 36415 COLL VENOUS BLD VENIPUNCTURE: CPT

## 2024-11-22 PROCEDURE — 84182 PROTEIN WESTERN BLOT TEST: CPT

## 2024-11-22 PROCEDURE — 86706 HEP B SURFACE ANTIBODY: CPT

## 2024-11-22 ASSESSMENT — PATIENT HEALTH QUESTIONNAIRE - PHQ9
2. FEELING DOWN, DEPRESSED OR HOPELESS: NOT AT ALL
1. LITTLE INTEREST OR PLEASURE IN DOING THINGS: NOT AT ALL
SUM OF ALL RESPONSES TO PHQ9 QUESTIONS 1 AND 2: 0

## 2024-11-22 ASSESSMENT — ENCOUNTER SYMPTOMS
OCCASIONAL FEELINGS OF UNSTEADINESS: 1
LOSS OF SENSATION IN FEET: 0
DEPRESSION: 0

## 2024-11-22 NOTE — PROGRESS NOTES
Subjective   Patient ID: [id number]   Radha Ellsworth is a 43 y.o. female who presents for a positive LOLA.    HPI  R Thumb and wrist hurts for a couple of years and for that reason testing was done.  NA 1:80, neg FELICIANO   ESR normal.   RF and CCP neg    In 2023: LOLA neg, RF neg  XR hands normal  Lumbar DDD    Had carpal tunnel b/l and had L release in 2008. Was getting CSI for the CMC and wrists by Dr. Ribeiro, last one was ~ a year ago. CSI helped significantly.     R CMC and wrist pain is significantly worse with movement so she tries not to use her R wrist.   Ibuprofen ketorolac helps.   She is unsure if prednisone helps joints but overall feels better generally if she takes prednisone and it helps itching.    Occasionally gets migratory oligoarticular pain in elbows, MCPs>PIPs>DIPs, often. Self-resolves and then starts in a few other joints. Worse with activity and improves with rest.   Pain is associated with swelling.   No dactylitis.     She dealt with chronic low back pain worsening over past few months radiates down her R leg that improved and was found to have bulging lumbar disc.      ROS  Constitutional: Denies fever, weight loss, + tension headaches a few times last few months.   Eyes: + dry eyes and blurry vision. No redness or pain or photophobia  ENT: + dry mouth. No dental loss, loss of taste, nasal or oral ulcers, difficulty swallowing, + recurrent sinus and bronchitis infections   Cardiovascular: Denies chest pain  Respiratory: Denies shortness of breath, cough  Gastrointestinal: Denies dysphagia, nausea, vomiting, heartburn, abdominal pain, constipation, diarrhea, melena or hematochezia. +sulfur burps.   Genitourinary: No recurrent urinary infections or STDs, no genital or anal ulcers.  Integumentary: + Rash on L eyebrow. + photosensitivity. No psoriatic lesions, or alopecia  Neurological: + numbness or tingling in both hands.  Hematologic/Lymphatic: Denies history of clots (arterial or venous). 2  first trimester miscarriages. 1 ectopic pregnancy. 4 c/s  MSK: as above     PMH/PSH: asthma, COPD, T2DM, GERD, Asperger, anxiety, depression, PTSD  Social: Occasionally smokes cigarettes, no alcohol, + smokes marijuana. Currently unemployed, hotel restaurant management prior to that. Has 4 children.   FHx: + for DM      Patient Active Problem List   Diagnosis    Abdominal pain    Abnormal findings on diagnostic imaging of liver and biliary tract    Abnormal weight gain    Bronchitis    Acute otitis media, right    Allergic reaction    Amenorrhea    Anesthesia of skin    Anxiety    Atopic dermatitis, unspecified    Bilateral acute serous otitis media    Cannabis dependence    Cardiomegaly    Bilateral carpal tunnel syndrome    Cough    Dermoid cyst    Diastolic hypertension    Diverticulitis    Edema, unspecified    Folliculitis    Hyperglycemia, unspecified    Hand pain    GERD (gastroesophageal reflux disease)    Left lower quadrant pain    Leukocytosis    Liver mass    Lobar pneumonia (CMS-HCC)    Marijuana use, episodic    Nausea and vomiting    Nicotine dependence    Other fatigue    Other hyperlipidemia    Pain of right shoulder region    Paresthesia of upper extremity    Peritonitis    Conjunctivitis of left eye    Rheumatoid arthritis, unspecified    Right flank pain    Shortness of breath    Tachycardia, unspecified    Thrombophlebitis of superficial veins of left lower extremity    Abrasion of eye region    Cyst of left upper eyelid    Depressive disorder    Drug-induced constipation    Infection of tooth    Obesity due to energy imbalance    Posttraumatic stress disorder    Otalgia of both ears    Positive antinuclear antibody        Past Medical History:   Diagnosis Date    Arthritis     Asperger's syndrome     COPD (chronic obstructive pulmonary disease) (Multi)     Tumor     left overy, benign    Unspecified asthma, uncomplicated ([facility]) 10/23/2017    Asthma        Past Surgical History:   Procedure  Laterality Date     SECTION, CLASSIC  2014     Section    HAND SURGERY  2014    Hand Surgery                                                                                                                                                              Social History     Socioeconomic History    Marital status: Single     Spouse name: Not on file    Number of children: Not on file    Years of education: Not on file    Highest education level: Not on file   Occupational History    Not on file   Tobacco Use    Smoking status: Every Day     Types: Cigarettes    Smokeless tobacco: Never   Vaping Use    Vaping status: Never Used   Substance and Sexual Activity    Alcohol use: Never    Drug use: Yes     Types: Marijuana     Comment: every other day    Sexual activity: Not on file   Other Topics Concern    Not on file   Social History Narrative    Not on file     Social Drivers of Health     Financial Resource Strain: Not on File (2022)    Received from [facility]    Financial Resource Strain     Financial Resource Strain: 0   Recent Concern: Financial Resource Strain - At Risk (2022)    Received from [facility]    Financial Resource Strain     Financial Resource Strain: 2   Food Insecurity: Not on File (2022)    Received from [facility]    Food Insecurity     Food: 0   Recent Concern: Food Insecurity - At Risk (2022)    Received from [facility]    Food Insecurity     Food: 2   Transportation Needs: Not on File (2022)    Received from [facility]    Transportation Needs     Transportation: 0   Physical Activity: Not on File (2021)    [facility]    Physical Activity     Physical Activity: 0   Stress: Not on File (2022)    Received from [facility]    Stress     Stress: 0   Recent Concern: Stress - At Risk (2022)    Received from [facility]    Stress     Stress: 2   Social Connections: Not on File (2022)    Received from [facility]    Social Connections     Connectedness: 0    Intimate Partner Violence: Not on file   Housing Stability: Not on File (2022)    Received from [facility]     Housin   Recent Concern: Housing Stability - At Risk (2022)    Received from [facility]     [place]ous[place]        Allergies   Allergen Reactions    Sulfa (Sulfonamide Antibiotics) Hives    Sulfamethoxazole-Trimethoprim Hives          Current Outpatient Medications:     albuterol 90 mcg/actuation inhaler, Inhale 2 puffs every 4 hours if needed for wheezing., Disp: 18 g, Rfl: 0    albuterol 90 mcg/actuation inhaler, Inhale 2 puffs every 4 hours if needed for wheezing., Disp: 18 g, Rfl: 0    albuterol 90 mcg/actuation inhaler, Inhale 1-2 puffs every 6 hours if needed for wheezing., Disp: 18 g, Rfl: 0    azithromycin (Zithromax Z-Joshua) 250 mg tablet, Take 1 tablet (250 mg) by mouth., Disp: , Rfl:     blood sugar diagnostic strip, 1 each twice a day., Disp: , Rfl:     cetirizine (ZyrTEC) 10 mg tablet, Take by mouth., Disp: , Rfl:     clindamycin (Cleocin) 150 mg capsule, Take by mouth every 6 hours., Disp: , Rfl:     clobetasol (Temovate) 0.05 % external solution, apply 1 APPLICATION to affected area twice a day, Disp: , Rfl:     Combivent Respimat  mcg/actuation inhaler, inhale 2 puffs as directed twice a day if needed for wheezing or shortness of breath, Disp: , Rfl:     cyclobenzaprine (Flexeril) 10 mg tablet, 1 tablet (10 mg)., Disp: , Rfl:     diclofenac sodium 3 % gel, Apply topically., Disp: , Rfl:     docusate sodium (Colace) 100 mg capsule, 1 capsule (100 mg)., Disp: , Rfl:     famotidine (Pepcid) 20 mg tablet, Take 1 tablet (20 mg) by mouth 2 times a day for 15 days., Disp: 30 tablet, Rfl: 0    ibuprofen 600 mg tablet, Take 1 tablet (600 mg) by mouth every 8 hours if needed for mild pain (1 - 3) or fever (temp greater than 38.0 C)., Disp: 30 tablet, Rfl: 0    ipratropium (Atrovent HFA) 17 mcg/actuation inhaler, Use 2 puffs daily., Disp: , Rfl:      ketorolac (Toradol) 10 mg tablet, 1 tablet (10 mg)., Disp: , Rfl:     levoFLOXacin (Levaquin) 750 mg tablet, Take 1 tablet (750 mg) by mouth once daily., Disp: , Rfl:     lidocaine (Lidoderm) 5 % patch, Place 1 patch over 12 hours on the skin once daily. Remove & discard patch within 12 hours or as directed by MD., Disp: 7 patch, Rfl: 0    loratadine (Claritin) 10 mg tablet, Take 1 tablet (10 mg) by mouth., Disp: , Rfl:     methocarbamol (Robaxin) 500 mg tablet, Take 1 tablet (500 mg) by mouth 3 times a day for 7 days. As needed for pain, Disp: 21 tablet, Rfl: 0    methylPREDNISolone (Medrol Dospak) 4 mg tablets, Follow schedule on package instructions, Disp: 21 tablet, Rfl: 0    mometasone-formoterol (Dulera 50) 50-5 mcg/actuation HFA aerosol inhaler inhaler, Inhale 2 puffs twice a day., Disp: , Rfl:     montelukast (Singulair) 10 mg tablet, Take 1 tab(s) orally once a day (in the evening), Disp: , Rfl:     naproxen-capsicum oleoresin 500 mg- 0.025 % kit, Take by mouth., Disp: , Rfl:     ondansetron ODT (Zofran-ODT) 4 mg disintegrating tablet, 1 tablet (4 mg)., Disp: , Rfl:     permethrin (Elimite) 5 % cream, Apply topically., Disp: , Rfl:     polyethylene glycol (Glycolax, Miralax) 17 gram/dose powder, Mix 17 g of powder and drink., Disp: , Rfl:     predniSONE (Deltasone) 10 mg tablet, Take 1 tablet (10 mg) by mouth. Take with food., Disp: , Rfl:     True Metrix Glucose Meter misc, as needed for blood glucose monitoring, Disp: , Rfl:     TRUEplus Lancets 28 gauge, , Disp: , Rfl:     Trulicity 0.75 mg/0.5 mL pen injector, 0.75 mg., Disp: , Rfl:        Objective     Visit Vitals  BP 98/61   Pulse 88      Physical Exam  General: AAOx3, Cooperative  Head: normocephalic, atraumatic  Eyes: EOMI, conjunctiva clear, sclera white, anicteric  Ears: no redness, swelling, tenderness  Nose: no deformity, no crusting   Throat/Mouth: No oral deformities, no cheek swelling, mucosa appear moist, no oral ulcers noted or loss of  "dentition   Neck/Lymph: FROM, trachea midline  Neuro: CN II-XII grossly intact, no focal deficit  Skin: Spots of hyper and hypopigmentation on face, arms, chest area.   MSK: Upper Extremities:  Hand/Fingers: TTP of 2nd and 3rd MCPs bilaterally, R 2nd and 3rd PIP, L 2nd, 3rd, and 4th PIP. No erythema, swelling, tenderness or warmth at DIP, PIP, or MCP joints, FROM grossly. Good hand . R CMC ttp and swelling.   Wrists: TTP of b/l wrists, R>L. R wrist swelling. No erythema, warmth. R wrist pain on ROM. + tinel's and phalen's.   Elbows: No tenderness, swelling, erythema or warmth at elbows, FROM grossly. No nodules   Shoulders:  FROM  Lower Extremities:   Hips: No obvious deformities. No joint tenderness, normal ROM grossly. Log roll test negative bilaterally. Silvino test is negative bilaterally.   Knees: No tenderness, deformities, swelling, rashes, or warmth, normal ROM grossly. + crepitus.  Ankles: No deformities, tenderness, edema, erythema, ulceration, or warmth at the ankle  Feet: Negative MTP squeeze. Normal ROM grossly. Skin peeling on plantar aspect of b/l feet.        Lab Results   Component Value Date    WBC 11.1 11/06/2024    HGB 13.8 11/06/2024    HCT 41.1 11/06/2024    MCV 83 11/06/2024     11/06/2024        Chemistry    Lab Results   Component Value Date/Time     11/06/2024 1138    K 4.3 11/06/2024 1138     11/06/2024 1138    CO2 28 11/06/2024 1138    BUN 10 11/06/2024 1138    CREATININE 0.69 11/06/2024 1138    Lab Results   Component Value Date/Time    CALCIUM 9.3 11/06/2024 1138    ALKPHOS 45 11/06/2024 1138    AST 12 11/06/2024 1138    ALT 18 11/06/2024 1138    BILITOT 0.3 11/06/2024 1138           No results found for: \"CRP\"   No results found for: \"LOLA\", \"RF\", \"SEDRATE\"   No results found for: \"CKTOTAL\"  Lab Results   Component Value Date    NEUTROABS 9.36 (H) 11/06/2024      No results found for: \"FERRITIN\"   No results found for: \"HEPATOT\", \"HEPAIGM\", \"HEPBCIGM\", \"HEPBCAB\", " "\"HBEAG\", \"HEPCAB\"   Lab Results   Component Value Date    ALT 18 11/06/2024    AST 12 11/06/2024    ALKPHOS 45 11/06/2024    BILITOT 0.3 11/06/2024      No results found for: \"PPD\"   No results found for: \"URICACID\"   Lab Results   Component Value Date    CALCIUM 9.3 11/06/2024      No results found for: \"SPEP\", \"UPEP\"   No results found for: \"ALBUR\", \"[id number]\"     CTPE 11/6/2024  UPPER ABDOMEN:  The visualized subdiaphragmatic structures demonstrate no acute  findings on limited images.      CHEST WALL and OSSEOUS STRUCTURES:  No suspicious osseous lesions. Mild degenerative changes of the  thoracic spine.          IMPRESSION:  1.  No pulmonary embolism identified.  2. Endobronchial mucous plugging involving the left upper lobe,  otherwise no active cardiopulmonary disease.    CTAP 09/16/2024  Liver: Stable hepatic hypodensities in the dome of the left lobe the   liver. The liver is otherwise unremarkable.     Biliary: No bile duct dilation.  Gallbladder is unremarkable.     Spleen: No mass. No splenomegaly.     Pancreas: No mass or duct dilation.     Adrenals: No mass.     Kidneys: No mass, calculus or hydronephrosis.     GI tract: No dilation or wall thickening. Normal appendix. No evidence of   bowel obstruction or perforation.     Lymph nodes: No abdominal or pelvic lymphadenopathy.     Mesentery/Peritoneum: No ascites or mass.     Retroperitoneum: No mass.     Vasculature:    - Abdominal aorta and iliac arteries: Atherosclerotic calcifications   without aneurysm.    - Celiac and SMA: Patent without stenosis.    - Portal venous system (SMV, splenic vein, portal vein and branches):   Patent.    - Hepatic veins: Patent.     Pelvis: Stable appearance of a predominantly fat attenuation mass with   smooth walls in the left upper pelvis measuring approximately 8.4 x 5.1 x   8.9 cm. This is associated with the left ovary. Finding most likely   represents a dermoid. No evidence of free fluid in the pelvis. The "   urinary bladder appears within normal limits.     Bones/Soft Tissues: Mild degenerative changes in the spine. Stable   irregular shaped soft tissue density within the subcutaneous tissues of   the left inguinal region and suprapubic location which are nonspecific   but may be related to postsurgical changes.     Lower thorax: Unremarkable.     CT LS 04/2024  FINDINGS:   The alignment is anatomic.  There is no fracture or traumatic  subluxation.  The vertebral body heights are well maintained.  At L3-4 there is intervertebral disc space narrowing.  There is a disc  bulge primarily to the left.  This does result in narrowing of the  left neural foramina.  The paravertebral soft tissues are within normal limits.  The  visualized abdomen is unremarkable.  IMPRESSION:  No evidence of acute fracture or subluxation.  Degenerative changes the lumbosacral spine at L3-4 with broad-based  disc bulge most pronounced on the left with narrowing of the left  neural foramina.    CTAP 02/06/2024      IMPRESSION:  Small hiatal hernia.      There are 3 hypodense liver lesions identified which do not have the  appearance of cysts. The possibility of hepatic hemangiomas is not  excluded. A nonemergent MRI of the liver could be performed for  further evaluation.      5.2 x 8.7 x 9.2 cm left adnexal mass containing adipose tissue most  consistent with large left ovarian dermoid/teratoma. The right ovary  is prominent containing a number of follicles.      Physiologic amount of free fluid in the pelvic cul-de-sac.      2.2 x 3.2 x 3.2 cm soft tissue mass in the left inguinal region with  additional soft tissue mass in the midline along the inferior aspect  of the linea alba. This particular soft tissue mass in the midline  was evaluated on ultrasound from 05/10/2016 and has not changed  significantly in size. Differential diagnosis would include  postoperative scarring/fibrosis/desmoid tumors or perhaps even  endometriosis.    XR hand  08/2023  RESULT:   There is negative ulnar variance.  No acute fracture or malalignment.    Degenerative changes at the first CMC joint.  Calcification in the region   of the triangular fibrocartilage.        Assessment/Plan    A 43 year old F here for evaluation of polyarthralgias, description is mixture of mechanical and inflammatory pains.   Has TTP that is not diffuse and is localized to few MCPs, PIPs, and wrists. She has some swelling of R wrist and R CMC.   She also has lumbar DDD    Also reports a photosensitive rash on her face and extremities and chest that leaves hypopigmented scars. has some malar erythema? That doesn't spare the nasolabial fold.     Is reporting some dry eyes and dry mouth and blurry vision.     On reviewe of prior labs:   No cytopenias, normal albumin, UA bland, LOLA 1:80, neg FELICIANO   ESR normal.   RF and CCP neg    In 2023: LOLA neg, RF neg  XR hands normal    - Labs today  - XR hands and if normal will likely need MRI to evaluate for synovitis/tenosynovitis.   - will refer to ortho for drainage of CMC/wrist if needed    RTC in 3 weeks for VV to discuss results    Karl Acevedo MD  Division of Rheumatology   [facility]

## 2024-11-23 LAB
APPEARANCE UR: CLEAR
BILIRUB UR STRIP.AUTO-MCNC: NEGATIVE MG/DL
C3 SERPL-MCNC: 124 MG/DL (ref 87–200)
C4 SERPL-MCNC: 26 MG/DL (ref 10–50)
CCP IGG SERPL-ACNC: <1 U/ML
CENTROMERE B AB SER-ACNC: <0.2 AI
CHROMATIN AB SERPL-ACNC: <0.2 AI
COLOR UR: YELLOW
DSDNA AB SER-ACNC: <1 IU/ML
ENA JO1 AB SER QL IA: <0.2 AI
ENA RNP AB SER IA-ACNC: <0.2 AI
ENA SCL70 AB SER QL IA: <0.2 AI
ENA SM AB SER IA-ACNC: <0.2 AI
ENA SM+RNP AB SER QL IA: <0.2 AI
ENA SS-A AB SER IA-ACNC: <0.2 AI
ENA SS-B AB SER IA-ACNC: <0.2 AI
GLUCOSE UR STRIP.AUTO-MCNC: NORMAL MG/DL
HBV CORE AB SER QL: NONREACTIVE
HBV SURFACE AB SER-ACNC: <3.1 MIU/ML
HBV SURFACE AG SERPL QL IA: NONREACTIVE
HCV AB SER QL: NONREACTIVE
KETONES UR STRIP.AUTO-MCNC: NEGATIVE MG/DL
LEUKOCYTE ESTERASE UR QL STRIP.AUTO: ABNORMAL
MUCOUS THREADS #/AREA URNS AUTO: NORMAL /LPF
NITRITE UR QL STRIP.AUTO: NEGATIVE
PH UR STRIP.AUTO: 6 [PH]
PROT SERPL-MCNC: 5.7 G/DL (ref 6.4–8.2)
PROT UR STRIP.AUTO-MCNC: ABNORMAL MG/DL
RBC # UR STRIP.AUTO: NEGATIVE /UL
RBC #/AREA URNS AUTO: NORMAL /HPF
RHEUMATOID FACT SER NEPH-ACNC: 11 IU/ML (ref 0–15)
RIBOSOMAL P AB SER-ACNC: <0.2 AI
SP GR UR STRIP.AUTO: 1.03
SQUAMOUS #/AREA URNS AUTO: NORMAL /HPF
UROBILINOGEN UR STRIP.AUTO-MCNC: NORMAL MG/DL
WBC #/AREA URNS AUTO: NORMAL /HPF

## 2024-11-24 LAB
NIL(NEG) CONTROL SPOT COUNT: NORMAL
PANEL A SPOT COUNT: 0
PANEL B SPOT COUNT: 0
POS CONTROL SPOT COUNT: NORMAL
T-SPOT. TB INTERPRETATION: NEGATIVE

## 2024-11-25 LAB
ALBUMIN: 3.5 G/DL (ref 3.4–5)
ALPHA 1 GLOBULIN: 0.3 G/DL (ref 0.2–0.6)
ALPHA 2 GLOBULIN: 0.6 G/DL (ref 0.4–1.1)
ANA SER QL HEP2 SUBST: NEGATIVE
BETA GLOBULIN: 0.7 G/DL (ref 0.5–1.2)
GAMMA GLOBULIN: 0.6 G/DL (ref 0.5–1.4)
PATH REVIEW-SERUM PROTEIN ELECTROPHORESIS: NORMAL
PROTEIN ELECTROPHORESIS COMMENT: NORMAL

## 2024-11-27 LAB — TPMT BLD-CCNC: 27.2 U/ML (ref 24–44)

## 2024-12-04 LAB
ANA SER QL: NEGATIVE
ANNOTATION COMMENT IMP: NORMAL
EJ AB SER QL: NEGATIVE
ENA JO1 IGG SER-ACNC: 0 AU/ML (ref 0–40)
ENA SS-A 60KD AB SER-ACNC: 0 AU/ML (ref 0–40)
ENA SS-A IGG SER QL: 1 AU/ML (ref 0–40)
FIBRILLARIN AB SER QL: NEGATIVE
KU AB SER QL: NEGATIVE
MDA5 AB SER QL LINE BLOT: NEGATIVE
MI2 AB SER QL: NEGATIVE
MJ AB SER QL LINE BLOT: NEGATIVE
OJ AB SER QL: NEGATIVE
PL12 AB SER QL: NEGATIVE
PL7 AB SER QL: NEGATIVE
PM/SCL-100 AB SER QL LINE BLOT: NEGATIVE
SAE1 AB SER QL LINE BLOT: NEGATIVE
SRP AB SERPL QL: NEGATIVE
TIF1-GAMMA AB SER QL LINE BLOT: NEGATIVE
U1 SNRNP IGG SER-ACNC: 4 UNITS (ref 0–19)

## 2024-12-05 DIAGNOSIS — R55 COLLAPSE: Primary | ICD-10-CM

## 2024-12-05 DIAGNOSIS — M25.832 ULNAR IMPINGEMENT SYNDROME OF LEFT UPPER EXTREMITY: ICD-10-CM

## 2025-01-08 ENCOUNTER — APPOINTMENT (OUTPATIENT)
Facility: CLINIC | Age: 45
End: 2025-01-08
Payer: COMMERCIAL

## 2025-01-15 ENCOUNTER — APPOINTMENT (OUTPATIENT)
Dept: RADIOLOGY | Facility: HOSPITAL | Age: 45
End: 2025-01-15
Payer: COMMERCIAL

## 2025-01-15 ENCOUNTER — HOSPITAL ENCOUNTER (EMERGENCY)
Facility: HOSPITAL | Age: 45
Discharge: HOME | End: 2025-01-15
Attending: EMERGENCY MEDICINE
Payer: COMMERCIAL

## 2025-01-15 VITALS
OXYGEN SATURATION: 99 % | SYSTOLIC BLOOD PRESSURE: 130 MMHG | HEIGHT: 62 IN | TEMPERATURE: 36.8 F | WEIGHT: 185 LBS | DIASTOLIC BLOOD PRESSURE: 72 MMHG | BODY MASS INDEX: 34.04 KG/M2 | RESPIRATION RATE: 19 BRPM | HEART RATE: 80 BPM

## 2025-01-15 DIAGNOSIS — J40 BRONCHITIS: Primary | ICD-10-CM

## 2025-01-15 LAB
BASOPHILS # BLD AUTO: 0.12 X10*3/UL (ref 0–0.1)
BASOPHILS NFR BLD AUTO: 0.7 %
D DIMER PPP FEU-MCNC: 350 NG/ML FEU
EOSINOPHIL # BLD AUTO: 0.47 X10*3/UL (ref 0–0.7)
EOSINOPHIL NFR BLD AUTO: 2.8 %
ERYTHROCYTE [DISTWIDTH] IN BLOOD BY AUTOMATED COUNT: 13.1 % (ref 11.5–14.5)
FLUAV RNA RESP QL NAA+PROBE: NOT DETECTED
FLUBV RNA RESP QL NAA+PROBE: NOT DETECTED
HCT VFR BLD AUTO: 48.8 % (ref 36–46)
HGB BLD-MCNC: 16 G/DL (ref 12–16)
IMM GRANULOCYTES # BLD AUTO: 0.1 X10*3/UL (ref 0–0.7)
IMM GRANULOCYTES NFR BLD AUTO: 0.6 % (ref 0–0.9)
LYMPHOCYTES # BLD AUTO: 2.15 X10*3/UL (ref 1.2–4.8)
LYMPHOCYTES NFR BLD AUTO: 12.9 %
MCH RBC QN AUTO: 27.9 PG (ref 26–34)
MCHC RBC AUTO-ENTMCNC: 32.8 G/DL (ref 32–36)
MCV RBC AUTO: 85 FL (ref 80–100)
MONOCYTES # BLD AUTO: 0.53 X10*3/UL (ref 0.1–1)
MONOCYTES NFR BLD AUTO: 3.2 %
NEUTROPHILS # BLD AUTO: 13.29 X10*3/UL (ref 1.2–7.7)
NEUTROPHILS NFR BLD AUTO: 79.8 %
NRBC BLD-RTO: 0 /100 WBCS (ref 0–0)
PLATELET # BLD AUTO: 368 X10*3/UL (ref 150–450)
RBC # BLD AUTO: 5.73 X10*6/UL (ref 4–5.2)
RSV RNA RESP QL NAA+PROBE: NOT DETECTED
SARS-COV-2 RNA RESP QL NAA+PROBE: NOT DETECTED
WBC # BLD AUTO: 16.7 X10*3/UL (ref 4.4–11.3)

## 2025-01-15 PROCEDURE — 85025 COMPLETE CBC W/AUTO DIFF WBC: CPT | Performed by: EMERGENCY MEDICINE

## 2025-01-15 PROCEDURE — 36415 COLL VENOUS BLD VENIPUNCTURE: CPT | Performed by: EMERGENCY MEDICINE

## 2025-01-15 PROCEDURE — 85379 FIBRIN DEGRADATION QUANT: CPT | Performed by: EMERGENCY MEDICINE

## 2025-01-15 PROCEDURE — 2500000002 HC RX 250 W HCPCS SELF ADMINISTERED DRUGS (ALT 637 FOR MEDICARE OP, ALT 636 FOR OP/ED): Mod: SE | Performed by: EMERGENCY MEDICINE

## 2025-01-15 PROCEDURE — 71045 X-RAY EXAM CHEST 1 VIEW: CPT | Performed by: RADIOLOGY

## 2025-01-15 PROCEDURE — 71045 X-RAY EXAM CHEST 1 VIEW: CPT

## 2025-01-15 PROCEDURE — 87637 SARSCOV2&INF A&B&RSV AMP PRB: CPT | Performed by: EMERGENCY MEDICINE

## 2025-01-15 PROCEDURE — 99284 EMERGENCY DEPT VISIT MOD MDM: CPT | Performed by: EMERGENCY MEDICINE

## 2025-01-15 RX ORDER — AZITHROMYCIN 250 MG/1
500 TABLET, FILM COATED ORAL ONCE
Status: COMPLETED | OUTPATIENT
Start: 2025-01-15 | End: 2025-01-15

## 2025-01-15 RX ORDER — AMOXICILLIN AND CLAVULANATE POTASSIUM 875; 125 MG/1; MG/1
1 TABLET, FILM COATED ORAL EVERY 12 HOURS
Qty: 14 TABLET | Refills: 0 | Status: SHIPPED | OUTPATIENT
Start: 2025-01-15 | End: 2025-01-22

## 2025-01-15 RX ADMIN — AZITHROMYCIN DIHYDRATE 500 MG: 250 TABLET ORAL at 19:19

## 2025-01-15 ASSESSMENT — PAIN - FUNCTIONAL ASSESSMENT
PAIN_FUNCTIONAL_ASSESSMENT: 0-10
PAIN_FUNCTIONAL_ASSESSMENT: 0-10

## 2025-01-15 ASSESSMENT — PAIN SCALES - GENERAL
PAINLEVEL_OUTOF10: 0 - NO PAIN
PAINLEVEL_OUTOF10: 3

## 2025-01-15 NOTE — ED PROVIDER NOTES
[facility]   ED  Provider Note  1/15/2025  4:47 PM  AC01/AC01      Chief Complaint   Patient presents with    Nasal Congestion    sleepy        History of Present Illness:   Radha Ellsworth is a 44 y.o. female presenting to the ED for cough congestion and fatigue, beginning 1 week ago.  The complaint has been persistent, moderate in severity, and worsened by nothing.  Patient feels very tired and rundown.  She has a productive cough.  She denies vomiting but has had some mild nausea.  She has had no constipation or diarrhea.  She denies chest pain.  She denies stiff neck or rash.  She does not have a sore throat.      Review of Systems:   Pertinent positives and review of systems as noted above.  Remaining 10 review of systems is negative or noncontributory to today's episode of care.  Review of Systems       --------------------------------------------- PAST HISTORY ---------------------------------------------  Past Medical History:   Past Medical History:   Diagnosis Date    Arthritis     Asperger's syndrome     COPD (chronic obstructive pulmonary disease) (Multi)     Tumor     left overy, benign    Unspecified asthma, uncomplicated ([facility]) 10/23/2017    Asthma        Past Surgical History:   Past Surgical History:   Procedure Laterality Date     SECTION, CLASSIC  2014     Section    HAND SURGERY  2014    Hand Surgery                                                                                                                                                              Social History:   Social History     Social History Narrative    Not on file        Family History: family history is not on file. Unless otherwise noted, family history is non contributory    Patient's Medications   New Prescriptions    No medications on file   Previous Medications    ALBUTEROL 90 MCG/ACTUATION INHALER    Inhale 2 puffs every 4 hours if needed for wheezing.    ALBUTEROL 90 MCG/ACTUATION INHALER     Inhale 2 puffs every 4 hours if needed for wheezing.    ALBUTEROL 90 MCG/ACTUATION INHALER    Inhale 1-2 puffs every 6 hours if needed for wheezing.    BLOOD SUGAR DIAGNOSTIC STRIP    1 each twice a day.    CLOBETASOL (TEMOVATE) 0.05 % EXTERNAL SOLUTION    apply 1 APPLICATION to affected area twice a day    COMBIVENT RESPIMAT  MCG/ACTUATION INHALER    inhale 2 puffs as directed twice a day if needed for wheezing or shortness of breath    CYCLOBENZAPRINE (FLEXERIL) 10 MG TABLET    1 tablet (10 mg).    DICLOFENAC SODIUM 3 % GEL    Apply topically.    DOCUSATE SODIUM (COLACE) 100 MG CAPSULE    1 capsule (100 mg).    FAMOTIDINE (PEPCID) 20 MG TABLET    Take 1 tablet (20 mg) by mouth 2 times a day for 15 days.    IBUPROFEN 600 MG TABLET    Take 1 tablet (600 mg) by mouth every 8 hours if needed for mild pain (1 - 3) or fever (temp greater than 38.0 C).    IPRATROPIUM (ATROVENT HFA) 17 MCG/ACTUATION INHALER    Use 2 puffs daily.    KETOROLAC (TORADOL) 10 MG TABLET    1 tablet (10 mg).    LIDOCAINE (LIDODERM) 5 % PATCH    Place 1 patch over 12 hours on the skin once daily. Remove & discard patch within 12 hours or as directed by MD.    METHOCARBAMOL (ROBAXIN) 500 MG TABLET    Take 1 tablet (500 mg) by mouth 3 times a day for 7 days. As needed for pain    METHYLPREDNISOLONE (MEDROL DOSPAK) 4 MG TABLETS    Follow schedule on package instructions    MOMETASONE-FORMOTEROL (DULERA 50) 50-5 MCG/ACTUATION HFA AEROSOL INHALER INHALER    Inhale 2 puffs twice a day.    NAPROXEN-CAPSICUM OLEORESIN 500 MG- 0.025 % KIT    Take by mouth.    ONDANSETRON ODT (ZOFRAN-ODT) 4 MG DISINTEGRATING TABLET    1 tablet (4 mg).    PREDNISONE (DELTASONE) 10 MG TABLET    Take 1 tablet (10 mg) by mouth. Take with food.    TRUE METRIX GLUCOSE METER MISC    as needed for blood glucose monitoring    TRUEPLUS LANCETS 28 GAUGE        TRULICITY 0.75 MG/0.5 ML PEN INJECTOR    0.75 mg.   Modified Medications    No medications on file   Discontinued  Medications    No medications on file      The patient’s home medications have been reviewed.    Allergies: Sulfa (sulfonamide antibiotics) and Sulfamethoxazole-trimethoprim    -------------------------------------------------- RESULTS -------------------------------------------------  All laboratory and radiology results have been personally reviewed by myself   LABS:  Labs Reviewed   CBC WITH AUTO DIFFERENTIAL - Abnormal       Result Value    WBC 16.7 (*)     nRBC 0.0      RBC 5.73 (*)     Hemoglobin 16.0      Hematocrit 48.8 (*)     MCV 85      MCH 27.9      MCHC 32.8      RDW 13.1      Platelets 368      Neutrophils % 79.8      Immature Granulocytes %, Automated 0.6      Lymphocytes % 12.9      Monocytes % 3.2      Eosinophils % 2.8      Basophils % 0.7      Neutrophils Absolute 13.29 (*)     Immature Granulocytes Absolute, Automated 0.10      Lymphocytes Absolute 2.15      Monocytes Absolute 0.53      Eosinophils Absolute 0.47      Basophils Absolute 0.12 (*)    SARS-COV-2 PCR - Normal    Coronavirus 2019, PCR Not Detected      Narrative:     This assay has received FDA Emergency Use Authorization (EUA) and is only authorized for the duration of time that circumstances exist to justify the authorization of the emergency use of in vitro diagnostic tests for the detection of SARS-CoV-2 virus and/or diagnosis of COVID-19 infection under section 564(b)(1) of the Act, 21 U.S.C. 360bbb-3(b)(1). This assay is an in vitro diagnostic nucleic acid amplification test for the qualitative detection of SARS-CoV-2 from nasopharyngeal specimens and has been validated for use at [facility]. Negative results do not preclude COVID-19 infections and should not be used as the sole basis for diagnosis, treatment, or other management decisions.     INFLUENZA A AND B PCR - Normal    Flu A Result Not Detected      Flu B Result Not Detected      Narrative:     This assay is an in vitro diagnostic multiplex  nucleic acid amplification test for the detection and discrimination of Influenza A & B from nasopharyngeal specimens, and has been validated for use at [facility]. Negative results do not preclude Influenza A/B infections, and should not be used as the sole basis for diagnosis, treatment, or other management decisions. If Influenza A/B and RSV PCR results are negative, testing for Parainfluenza virus, Adenovirus and Metapneumovirus is routinely performed for [facility] pediatric oncology and intensive care inpatients, and is available on other patients by placing an add-on request.   RSV PCR - Normal    RSV PCR Not Detected      Narrative:     This assay is an FDA-cleared, in vitro diagnostic nucleic acid amplification test for the detection of RSV from nasopharyngeal specimens, and has been validated for use at [facility]. Negative results do not preclude RSV infections, and should not be used as the sole basis for diagnosis, treatment, or other management decisions. If Influenza A/B and RSV PCR results are negative, testing for Parainfluenza virus, Adenovirus and Metapneumovirus is routinely performed for pediatric oncology and intensive care inpatients at [facility], and is available on other patients by placing an add-on request.       D-DIMER, NON VTE - Normal    D-Dimer Non VTE, Quant (ng/mL FEU) 350      Narrative:     The D-Dimer assay is reported in ng/mL Fibrinogen Equivalent Units (FEU). The results of this assay should NOT be used for the exclusion of Deep Vein Thrombosis and/or Pulmonary Embolism.         RADIOLOGY:  Interpreted by Radiologist.  XR chest 1 view   Final Result   1.  No evidence of acute cardiopulmonary process.                  MACRO:   None        Signed by: Debra Workman 1/15/2025 6:30 PM   Dictation workstation:   [id number]          Encounter Date: 11/06/24   ECG 12 lead   Result Value    Ventricular Rate 88    Atrial Rate 88    MN Interval 138     "QRS Duration 76    QT Interval 352    QTC Calculation(Bazett) 425    P Axis 69    R Axis 78    T Axis 76    QRS Count 14    Q Onset 222    P Onset 153    P Offset 199    T Offset 398    QTC Fredericia 400    Narrative    Normal sinus rhythm  Normal ECG  When compared with ECG of 06-FEB-2024 16:01,  No significant change was found  See ED provider note for full interpretation and clinical correlation  Confirmed by Elisa Marcelo ([id number]) on 11/15/2024 11:36:50 AM     ------------------------- NURSING NOTES AND VITALS REVIEWED ---------------------------   The nursing notes within the ED encounter and vital signs as below have been reviewed.   /78   Pulse 83   Temp 35.8 °C (96.5 °F) (Tympanic)   Resp 18   Ht 1.575 m (5' 2\")   Wt 83.9 kg (185 lb)   LMP 01/06/2025   SpO2 99%   BMI 33.84 kg/m²   Oxygen Saturation Interpretation: Normal      ---------------------------------------------------PHYSICAL EXAM--------------------------------------  Physical Exam   Constitutional/General: Alert,  well appearing, non toxic in NAD  Head: Normocephalic and atraumatic  Eyes: PERRL, EOMI, conjunctiva normal, sclera non icteric  Mouth: Oropharynx clear, handling secretions, no trismus, no asymmetry of the posterior oropharynx or uvular edema  Neck: Supple, full ROM, non tender to palpation in the midline, no stridor, no crepitus, no meningeal signs  Respiratory: Lungs clear to auscultation bilaterally, no wheezes, rales, or rhonchi. Not in respiratory distress  Cardiovascular:  Regular rate. Regular rhythm. No murmurs, gallops, or rubs. 2+ distal pulses  Chest: No chest wall tenderness  GI:  Abdomen Soft, Non tender, Non distended.  +BS. No organomegaly, no palpable masses,  No rebound, guarding, or rigidity.   Musculoskeletal: Moves all extremities x 4. Warm and well perfused, no clubbing, cyanosis, or edema. Capillary refill <3 seconds  Integument: skin warm and dry. No rashes.   Lymphatic: no lymphadenopathy " noted  Neurologic: No focal deficits, symmetric strength 5/5 in the upper and lower extremities bilaterally  Psychiatric: Normal Affect    Procedures    ------------------------------ ED COURSE/MEDICAL DECISION MAKING----------------------  ED Course as of 01/15/25 1911   Wed Dusty 15, 2025   1904 Platelets: 368  Platelet count clearance 68,000 [PC]      ED Course User Index  [PC] Devin Mckeon MD         Diagnoses as of 01/15/25 1911   Bronchitis      Patient has a productive cough and feels rundown.  Her chest x-ray is clear with no evidence of pneumonia.  Her white count is elevated at 16.7, hemoglobin is 16 and the count is 348,000.  Flu COVID and RSV testing are negative.  D-dimer testing is negative.  Patient is a smoker.  I suspect the patient has primarily bronchitis causing her productive cough and low-grade fever.  She is stable for outpatient management with normal vital signs.  I will place her on Zithromax and suggest she follow-up with her primary care doctor at the beginning of the week.  She is return for worsening signs or symptoms.      Medical Decision Making:   Discharge to home  ED Course as of 01/15/25 1911   Wed Dusty 15, 2025   1904 Platelets: 368  Platelet count clearance 68,000 [PC]      ED Course User Index  [PC] Devin Mckeon MD         Diagnoses as of 01/15/25 1911   Bronchitis      Counseling:   The emergency provider has spoken with the patient and discussed today’s results, in addition to providing specific details for the plan of care and counseling regarding the diagnosis and prognosis.  Questions are answered at this time and they are agreeable with the plan.      --------------------------------- IMPRESSION AND DISPOSITION ---------------------------------        IMPRESSION  1. Bronchitis        DISPOSITION  Disposition: Discharge to home  Patient condition is fair      Billing Provider Critical Care Time: 0 minutes     Devin Mckeon MD  01/15/25 1911

## 2025-01-16 NOTE — DISCHARGE INSTRUCTIONS
Zithromax as prescribed.    Follow-up with primary care doctor in 4 to 5 days.    Return for worsening symptoms or concerns.

## 2025-02-17 ENCOUNTER — APPOINTMENT (OUTPATIENT)
Dept: RADIOLOGY | Facility: HOSPITAL | Age: 45
End: 2025-02-17
Payer: COMMERCIAL

## 2025-02-21 ENCOUNTER — APPOINTMENT (OUTPATIENT)
Facility: CLINIC | Age: 45
End: 2025-02-21
Payer: COMMERCIAL

## 2025-02-27 ENCOUNTER — HOSPITAL ENCOUNTER (OUTPATIENT)
Dept: RADIOLOGY | Facility: HOSPITAL | Age: 45
Discharge: HOME | End: 2025-02-27
Payer: COMMERCIAL

## 2025-02-27 DIAGNOSIS — M25.832 ULNAR IMPINGEMENT SYNDROME OF LEFT UPPER EXTREMITY: ICD-10-CM

## 2025-02-27 PROCEDURE — A9575 INJ GADOTERATE MEGLUMI 0.1ML: HCPCS | Mod: SE | Performed by: INTERNAL MEDICINE

## 2025-02-27 PROCEDURE — 73220 MRI UPPR EXTREMITY W/O&W/DYE: CPT | Mod: LT

## 2025-02-27 PROCEDURE — 2550000001 HC RX 255 CONTRASTS: Mod: SE | Performed by: INTERNAL MEDICINE

## 2025-02-27 RX ORDER — GADOTERATE MEGLUMINE 376.9 MG/ML
17 INJECTION INTRAVENOUS
Status: COMPLETED | OUTPATIENT
Start: 2025-02-27 | End: 2025-02-27

## 2025-02-27 RX ADMIN — GADOTERATE MEGLUMINE 17 ML: 376.9 INJECTION INTRAVENOUS at 11:57

## 2025-03-05 ENCOUNTER — APPOINTMENT (OUTPATIENT)
Facility: CLINIC | Age: 45
End: 2025-03-05
Payer: COMMERCIAL

## 2025-03-07 ENCOUNTER — OFFICE VISIT (OUTPATIENT)
Facility: CLINIC | Age: 45
End: 2025-03-07
Payer: COMMERCIAL

## 2025-03-07 VITALS
BODY MASS INDEX: 35.3 KG/M2 | HEART RATE: 96 BPM | DIASTOLIC BLOOD PRESSURE: 80 MMHG | OXYGEN SATURATION: 97 % | WEIGHT: 193 LBS | SYSTOLIC BLOOD PRESSURE: 126 MMHG

## 2025-03-07 DIAGNOSIS — M87.9 OSTEONECROSIS: ICD-10-CM

## 2025-03-07 DIAGNOSIS — M25.50 POLYARTHRALGIA: Primary | ICD-10-CM

## 2025-03-07 DIAGNOSIS — R21 RASH: ICD-10-CM

## 2025-03-07 DIAGNOSIS — M19.049 CMC ARTHRITIS: ICD-10-CM

## 2025-03-07 DIAGNOSIS — R76.8 POSITIVE ANTINUCLEAR ANTIBODY: ICD-10-CM

## 2025-03-07 DIAGNOSIS — G56.03 BILATERAL CARPAL TUNNEL SYNDROME: ICD-10-CM

## 2025-03-07 DIAGNOSIS — M25.832 ULNAR IMPINGEMENT SYNDROME OF LEFT UPPER EXTREMITY: ICD-10-CM

## 2025-03-07 PROCEDURE — 99215 OFFICE O/P EST HI 40 MIN: CPT | Performed by: INTERNAL MEDICINE

## 2025-03-07 PROCEDURE — 3079F DIAST BP 80-89 MM HG: CPT | Performed by: INTERNAL MEDICINE

## 2025-03-07 PROCEDURE — 3074F SYST BP LT 130 MM HG: CPT | Performed by: INTERNAL MEDICINE

## 2025-03-07 RX ORDER — GABAPENTIN 100 MG/1
100 CAPSULE ORAL EVERY 8 HOURS
COMMUNITY
Start: 2025-02-07 | End: 2025-03-09

## 2025-03-07 ASSESSMENT — ENCOUNTER SYMPTOMS
DEPRESSION: 0
OCCASIONAL FEELINGS OF UNSTEADINESS: 0
LOSS OF SENSATION IN FEET: 0

## 2025-03-07 NOTE — PROGRESS NOTES
Subjective   Patient ID: [id number]  Radha Ellsworth is a 44 y.o. female who presents for Follow-up.  HPI  PMH/PSH: asthma, COPD, T2DM, GERD, Asperger, anxiety, depression, PTSD  Social: Occasionally smokes cigarettes, no alcohol, + smokes marijuana. Currently unemployed, hotel restaurant management prior to that. Has 4 children.   FHx: + for DM     Recall:  R Thumb and wrist hurts for a couple of years and for that reason testing was done.  LOLA 1:80, neg FELICIANO   ESR normal.   RF and CCP neg     In 2023: LOLA neg, RF neg  XR hands normal  Lumbar DDD     Had carpal tunnel b/l and had L release in 2008. Was getting CSI for the CMC and wrists by Dr. Ribeiro, last one was ~ a year ago. CSI helped significantly.      R CMC and wrist pain is significantly worse with movement so she tries not to use her R wrist.   Ibuprofen ketorolac helps.   She is unsure if prednisone helps joints but overall feels better generally if she takes prednisone and it helps itching.     Occasionally gets migratory oligoarticular pain in elbows, MCPs>PIPs>DIPs, often. Self-resolves and then starts in a few other joints. Worse with activity and improves with rest.   Pain is associated with swelling.   No dactylitis.      She dealt with chronic low back pain worsening over past few months radiates down her R leg that improved and was found to have bulging lumbar disc.       Interval Hx:  She has hypopigmented rashes on her face, and she developed a new rash on her eyebrows, not present today   She reports getting q3 month CSI for CMC and wrist   After aguila she reports having an episode of swelling of her L elbow.   MR 2/27: fragmented lunate with ON, moderate negative ulnar variance, no inflammatory arthropathy, benign cystic changes     Denies fever, weight loss, + tension headaches a few times last few months. + dry eyes and blurry vision. No redness or pain or photophobia. + dry mouth. No dental loss, loss of taste, nasal or oral ulcers,  difficulty swallowing, + recurrent sinus and bronchitis infections. Denies chest pain. Denies shortness of breath, cough. Denies dysphagia, nausea, vomiting, heartburn, abdominal pain, constipation, diarrhea, melena or hematochezia. +sulfur burps. No recurrent urinary infections or STDs, no genital or anal ulcers. + Rash on L eyebrow. + photosensitivity. No psoriatic lesions, or alopecia. + numbness or tingling in both hands. Denies history of clots (arterial or venous). 2 first trimester miscarriages. 1 ectopic pregnancy. 4 c/s      Patient Active Problem List   Diagnosis    Abdominal pain    Abnormal findings on diagnostic imaging of liver and biliary tract    Abnormal weight gain    Bronchitis    Acute otitis media, right    Allergic reaction    Amenorrhea    Anesthesia of skin    Anxiety    Atopic dermatitis, unspecified    Bilateral acute serous otitis media    Cannabis dependence    Cardiomegaly    Bilateral carpal tunnel syndrome    Cough    Dermoid cyst    Diastolic hypertension    Diverticulitis    Edema, unspecified    Folliculitis    Hyperglycemia, unspecified    Hand pain    GERD (gastroesophageal reflux disease)    Left lower quadrant pain    Leukocytosis    Liver mass    Lobar pneumonia (CMS-HCC)    Marijuana use, episodic    Nausea and vomiting    Nicotine dependence    Other fatigue    Other hyperlipidemia    Pain of right shoulder region    Paresthesia of upper extremity    Peritonitis    Conjunctivitis of left eye    Rheumatoid arthritis, unspecified    Right flank pain    Shortness of breath    Tachycardia, unspecified    Thrombophlebitis of superficial veins of left lower extremity    Abrasion of eye region    Cyst of left upper eyelid    Depressive disorder    Drug-induced constipation    Infection of tooth    Obesity due to energy imbalance    Posttraumatic stress disorder    Otalgia of both ears    Positive antinuclear antibody       Past Medical History:   Diagnosis Date    Arthritis      Asperger's syndrome     COPD (chronic obstructive pulmonary disease) ([facility])     Tumor     left overy, benign    Unspecified asthma, uncomplicated ([facility]) 10/23/2017    Asthma       Past Surgical History:   Procedure Laterality Date     SECTION, CLASSIC  2014     Section    HAND SURGERY  2014    Hand Surgery                                                                                                                                                             Social History     Socioeconomic History    Marital status: Single     Spouse name: Not on file    Number of children: Not on file    Years of education: Not on file    Highest education level: Not on file   Occupational History    Not on file   Tobacco Use    Smoking status: Some Days     Types: Cigarettes    Smokeless tobacco: Never   Vaping Use    Vaping status: Never Used   Substance and Sexual Activity    Alcohol use: Never    Drug use: Yes     Types: Marijuana     Comment: every other day    Sexual activity: Not on file   Other Topics Concern    Not on file   Social History Narrative    Not on file     Social Drivers of Health     Financial Resource Strain: Not on File (2022)    Received from [facility]    Financial Resource Strain     Financial Resource Strain: 0   Recent Concern: Financial Resource Strain - At Risk (2022)    Received from [facility]    Financial Resource Strain     Financial Resource Strain: 2   Food Insecurity: Not on File (2022)    Received from [facility]    Food Insecurity     Food: 0   Recent Concern: Food Insecurity - At Risk (2022)    Received from [facility]    Food Insecurity     Food: 2   Transportation Needs: Not on File (2022)    Received from [facility]    Transportation Needs     Transportation: 0   Physical Activity: Not on File (2021)    Received from [facility]    Physical Activity     Physical Activity: 0   Stress: Not on File (2022)    Received from [facility]    Stress      Stress: 0   Recent Concern: Stress - At Risk (2022)    Received from [facility]    Stress     Stress: 2   Social Connections: Not on File (2022)    Received from [facility]    Social Connections     Connectedness: 0   Intimate Partner Violence: Not on file   Housing Stability: Not on File (2022)    Received from [facility]    Housing Stability     Housin   Recent Concern: Housing Stability - At Risk (2022)    Received from [facility]    [facility] Stability     Housin       Allergies   Allergen Reactions    Sulfa (Sulfonamide Antibiotics) Hives    Sulfamethoxazole-Trimethoprim Hives         Current Outpatient Medications:     blood sugar diagnostic strip, 1 each twice a day., Disp: , Rfl:     clobetasol (Temovate) 0.05 % external solution, apply 1 APPLICATION to affected area twice a day, Disp: , Rfl:     Combivent Respimat  mcg/actuation inhaler, inhale 2 puffs as directed twice a day if needed for wheezing or shortness of breath, Disp: , Rfl:     cyclobenzaprine (Flexeril) 10 mg tablet, 1 tablet (10 mg)., Disp: , Rfl:     diclofenac sodium 3 % gel, Apply topically., Disp: , Rfl:     docusate sodium (Colace) 100 mg capsule, 1 capsule (100 mg)., Disp: , Rfl:     ibuprofen 600 mg tablet, Take 1 tablet (600 mg) by mouth every 8 hours if needed for mild pain (1 - 3) or fever (temp greater than 38.0 C)., Disp: 30 tablet, Rfl: 0    ipratropium (Atrovent HFA) 17 mcg/actuation inhaler, Use 2 puffs daily., Disp: , Rfl:     ketorolac (Toradol) 10 mg tablet, 1 tablet (10 mg)., Disp: , Rfl:     lidocaine (Lidoderm) 5 % patch, Place 1 patch over 12 hours on the skin once daily. Remove & discard patch within 12 hours or as directed by MD., Disp: 7 patch, Rfl: 0    mometasone-formoterol (Dulera 50) 50-5 mcg/actuation HFA aerosol inhaler inhaler, Inhale 2 puffs twice a day., Disp: , Rfl:     naproxen-capsicum oleoresin 500 mg- 0.025 % kit, Take by mouth., Disp: , Rfl:     ondansetron ODT (Zofran-ODT) 4 mg  disintegrating tablet, 1 tablet (4 mg)., Disp: , Rfl:     predniSONE (Deltasone) 10 mg tablet, Take 1 tablet (10 mg) by mouth. Take with food., Disp: , Rfl:     True Metrix Glucose Meter misc, as needed for blood glucose monitoring, Disp: , Rfl:     TRUEplus Lancets 28 gauge, , Disp: , Rfl:     Trulicity 0.75 mg/0.5 mL pen injector, 0.75 mg., Disp: , Rfl:     albuterol 90 mcg/actuation inhaler, Inhale 2 puffs every 4 hours if needed for wheezing., Disp: 18 g, Rfl: 0    albuterol 90 mcg/actuation inhaler, Inhale 2 puffs every 4 hours if needed for wheezing., Disp: 18 g, Rfl: 0    albuterol 90 mcg/actuation inhaler, Inhale 1-2 puffs every 6 hours if needed for wheezing., Disp: 18 g, Rfl: 0    famotidine (Pepcid) 20 mg tablet, Take 1 tablet (20 mg) by mouth 2 times a day for 15 days., Disp: 30 tablet, Rfl: 0    gabapentin (Neurontin) 100 mg capsule, Take 1 capsule (100 mg) by mouth every 8 hours. (Patient not taking: Reported on 3/7/2025), Disp: , Rfl:     methocarbamol (Robaxin) 500 mg tablet, Take 1 tablet (500 mg) by mouth 3 times a day for 7 days. As needed for pain, Disp: 21 tablet, Rfl: 0    methylPREDNISolone (Medrol Dospak) 4 mg tablets, Follow schedule on package instructions (Patient not taking: Reported on 11/22/2024), Disp: 21 tablet, Rfl: 0    Objective     Visit Vitals  /80 (BP Location: Left arm, Patient Position: Sitting)   Pulse 96     Physical Exam  Copied from previous exam unless annotated below   General: AAOx3, Cooperative  Head: normocephalic, atraumatic  Eyes: EOMI, conjunctiva clear, sclera white, anicteric  Ears: no redness, swelling, tenderness  Nose: no deformity, no crusting   Throat/Mouth: No oral deformities, no cheek swelling, mucosa appear moist, no oral ulcers noted or loss of dentition   Neck/Lymph: FROM, trachea midline  Neuro: CN II-XII grossly intact, no focal deficit  Skin: Spots of hyper and hypopigmentation on face, arms, chest area.   MSK: Upper  Extremities:  Hand/Fingers: TTP of 2nd and 3rd MCPs bilaterally, R 2nd and 3rd PIP, L 2nd, 3rd, and 4th PIP. No erythema, swelling, tenderness or warmth at DIP, PIP, or MCP joints, FROM grossly. Good hand . R CMC ttp and swelling.   Wrists: TTP of b/l wrists, R>L. R wrist swelling. No erythema, warmth. R wrist pain on ROM. + tinel's and phalen's.   Elbows: No tenderness, swelling, erythema or warmth at elbows, FROM grossly. No nodules   Shoulders:  FROM  Lower Extremities:   Hips: No obvious deformities. No joint tenderness, normal ROM grossly. Log roll test negative bilaterally. Silvino test is negative bilaterally.   Knees: No tenderness, deformities, swelling, rashes, or warmth, normal ROM grossly. + crepitus.  Ankles: No deformities, tenderness, edema, erythema, ulceration, or warmth at the ankle  Feet: Negative MTP squeeze. Normal ROM grossly. Skin peeling on plantar aspect of b/l feet.         Lab Results   Component Value Date    WBC 16.7 (H) 01/15/2025    HGB 16.0 01/15/2025    HCT 48.8 (H) 01/15/2025    MCV 85 01/15/2025     01/15/2025       Chemistry    Lab Results   Component Value Date/Time     11/22/2024 1448    K 3.5 11/22/2024 1448     11/22/2024 1448    CO2 26 11/22/2024 1448    BUN 14 11/22/2024 1448    CREATININE 0.82 11/22/2024 1448    Lab Results   Component Value Date/Time    CALCIUM 8.6 11/22/2024 1448    ALKPHOS 45 11/22/2024 1448    AST 16 11/22/2024 1448    ALT 21 11/22/2024 1448    BILITOT 0.3 11/22/2024 1448          Lab Results   Component Value Date    CRP 0.20 11/22/2024    LOLA Negative 11/22/2024    JO1 <0.2 11/22/2024    SPEP Normal. 11/22/2024    CALCIUM 8.6 11/22/2024     Alkaline Phosphatase   Date Value Ref Range Status   11/22/2024 45 33 - 110 U/L Final     AST   Date Value Ref Range Status   11/22/2024 16 9 - 39 U/L Final     Urea Nitrogen   Date Value Ref Range Status   11/22/2024 14 6 - 23 mg/dL Final     Sodium   Date Value Ref Range Status   11/22/2024  136 136 - 145 mmol/L Final     Potassium   Date Value Ref Range Status   11/22/2024 3.5 3.5 - 5.3 mmol/L Final     Bicarbonate   Date Value Ref Range Status   11/22/2024 26 21 - 32 mmol/L Final     ALT   Date Value Ref Range Status   11/22/2024 21 7 - 45 U/L Final     Comment:     Patients treated with Sulfasalazine may generate falsely decreased results for ALT.       MR hand left w and wo IV contrast  Narrative: Interpreted By:  Edith Rodriguez and Lawrence Austen   STUDY:  MRI of the  left hand with and without IV contrast;  2/27/2025 11:55  am      INDICATION:  Signs/Symptoms:evaluate lunate, fracture vs AVN, also evaluate for  inflammatory arthritis and for ulnar impingement.      ,M25.832 Other specified joint disorders, left wrist      COMPARISON:  Radiographs 11/22/2024      ACCESSION NUMBER(S):  [id number]      ORDERING CLINICIAN:  FRANCIS FOLEY      TECHNIQUE:  MR imaging of the  left hand was obtained  with and without  administration of intravenous contrast medium.      FINDINGS:  Suboptimal secondary to motion artifact.      Tendons:  The flexor and extensor tendons of the hand are intact. The tendons  of the thenar and hypothenar compartments are normal.      Ligaments:  Presumed tear of the scapholunate ligament given nonvisualization of  the lunate. There appears to be tear of the lamina of the triangular  fibrocartilage near the ulnar styloid attachment with irregularity.      Joints:      Joint spaces of the metacarpophalangeal and interphalangeal joints  are maintained. No synovitis, periarticular edema or abnormal  enhancement. There is small focal cystic structure noted in the  posterior aspect of the 1st CMC joint compatible with a benign  ganglion cyst.      Osseous structures:  Redemonstrated severe fragmentation of the lunate with near  nonvisualization consistent with chronic osteonecrosis and marked  fragmentation. Mild reactive marrow edema and enhancement in the  scaphoid and  capitate. Prominent cyst is noted in the proximal aspect  of the capitate. There is proximal migration of the capitate  secondary to collapsed lunate. There is a small benign cyst in the  proximal 4th metacarpal. Redemonstrated negative ulnar variance.      Soft tissues:  8 mm ganglion cyst at the dorsal 1st carpometacarpal joint.  No suspicious soft tissue lesions are seen.      Impression: 1. Redemonstrated findings of markedly fragmented lunate with  osteonecrosis with associated proximal migration of the capitate.  2. Redemonstrated moderate negative ulnar variance.  3. No findings of inflammatory arthropathy involving the  articulations of the hand.  4. Incidental findings as described above that include benign cystic  changes of the proximal capitate and proximal 4th metacarpal as well  as small ganglion cyst in the dorsal aspect of the 1st CMC joint.          I personally reviewed the images/study and I agree with the findings  as stated. This study was interpreted at [place], [place].      MACRO:  None      Signed by: Edith Rodriguez 2/27/2025 3:05 PM  Dictation workstation:   [id number]      CT  04/2024  FINDINGS:   The alignment is anatomic.  There is no fracture or traumatic  subluxation.  The vertebral body heights are well maintained.  At L3-4 there is intervertebral disc space narrowing.  There is a disc  bulge primarily to the left.  This does result in narrowing of the  left neural foramina.  The paravertebral soft tissues are within normal limits.  The  visualized abdomen is unremarkable.  IMPRESSION:  No evidence of acute fracture or subluxation.  Degenerative changes the lumbosacral spine at L3-4 with broad-based  disc bulge most pronounced on the left with narrowing of the left  neural foramina.     CTAP 02/06/2024      IMPRESSION:  Small hiatal hernia.      There are 3 hypodense liver lesions identified which do not have the  appearance of  cysts. The possibility of hepatic hemangiomas is not  excluded. A nonemergent MRI of the liver could be performed for  further evaluation.      5.2 x 8.7 x 9.2 cm left adnexal mass containing adipose tissue most  consistent with large left ovarian dermoid/teratoma. The right ovary  is prominent containing a number of follicles.      Physiologic amount of free fluid in the pelvic cul-de-sac.      2.2 x 3.2 x 3.2 cm soft tissue mass in the left inguinal region with  additional soft tissue mass in the midline along the inferior aspect  of the linea alba. This particular soft tissue mass in the midline  was evaluated on ultrasound from 05/10/2016 and has not changed  significantly in size. Differential diagnosis would include  postoperative scarring/fibrosis/desmoid tumors or perhaps even  endometriosis.     XR hand 08/2023  RESULT:   There is negative ulnar variance.  No acute fracture or malalignment.    Degenerative changes at the first CMC joint.  Calcification in the region   of the triangular fibrocartilage.        Assessment/Plan   A 43 year old F here for follow up.   First visit was for evaluation of polyarthralgias, description is mixture of mechanical and inflammatory pains.   Has TTP that is not diffuse and is localized to few MCPs, PIPs, and wrists. She has some swelling of R wrist and R CMC.   She also has lumbar DDD    Labs 11/22/2024 reviewed : leukocytosis, LOLA and FELICIANO neg, normal complements, normal inflammatory markers, bland UA, RF, CCP neg, normal renal function.      XR hand reviewed with osteonecrosis of left lunate, severe 1st CMC OA and evidence of ulnar impingement  MR L hand 2/27/2025 reviewed with markedly fragmented lunate with ON, no findings of inflammatory arthritis      Also reports a photosensitive rash on her face and extremities and chest that leaves hypopigmented scars, malar erythema that doesn't spare the nasolabial fold.      Is reporting some sicca symptoms (dry eyes and dry  mouth) and blurry vision.      On reviewe of prior labs:   No cytopenias, normal albumin, UA bland, LOLA 1:80, neg FELICIANO   ESR normal.   RF and CCP neg     In 2023: LOLA neg, RF neg  XR hands normal     Impression:   Query inflammatory arthritis  Rash  Osteonecrosis RF: prednisone, intra-articular CSI vs underlying SLE?     - MR R hand given ongoing pain, that she describes as inflammatory, swelling of R wrist, no improvement with conservative measures. L hand MRI without evidence of inflammatory arthritis but could be asymmetrical pattern. Has CMC OA, but that wouldn't explain her pain in MCPs and wrist and R wrist swelling.   - will refer to ortho for L lunate ON and ulnar impingement syndrome.   - will refer to dermatology for evaluation of rash.      Karl Acevedo MD  Division of Rheumatology   [facility]

## 2025-03-26 ENCOUNTER — APPOINTMENT (OUTPATIENT)
Facility: HOSPITAL | Age: 45
End: 2025-03-26
Payer: COMMERCIAL

## 2025-04-09 ENCOUNTER — ANCILLARY PROCEDURE (OUTPATIENT)
Facility: HOSPITAL | Age: 45
End: 2025-04-09
Payer: COMMERCIAL

## 2025-04-09 DIAGNOSIS — M25.50 POLYARTHRALGIA: ICD-10-CM

## 2025-04-09 DIAGNOSIS — M19.049 CMC ARTHRITIS: ICD-10-CM

## 2025-04-09 PROCEDURE — 73218 MRI UPPER EXTREMITY W/O DYE: CPT | Mod: RIGHT SIDE | Performed by: RADIOLOGY

## 2025-04-09 PROCEDURE — 73218 MRI UPPER EXTREMITY W/O DYE: CPT | Mod: RT

## 2025-04-14 ENCOUNTER — APPOINTMENT (OUTPATIENT)
Dept: ORTHOPEDIC SURGERY | Facility: CLINIC | Age: 45
End: 2025-04-14
Payer: COMMERCIAL

## 2025-05-19 ENCOUNTER — APPOINTMENT (OUTPATIENT)
Dept: DERMATOLOGY | Facility: CLINIC | Age: 45
End: 2025-05-19
Payer: COMMERCIAL

## 2025-06-06 ENCOUNTER — APPOINTMENT (OUTPATIENT)
Facility: CLINIC | Age: 45
End: 2025-06-06
Payer: COMMERCIAL

## 2025-06-06 DIAGNOSIS — M87.9 OSTEONECROSIS: ICD-10-CM

## 2025-06-06 DIAGNOSIS — M19.049 CMC ARTHRITIS: ICD-10-CM

## 2025-06-06 DIAGNOSIS — M25.50 POLYARTHRALGIA: Primary | ICD-10-CM

## 2025-06-06 DIAGNOSIS — M25.832 ULNAR IMPINGEMENT SYNDROME OF LEFT UPPER EXTREMITY: ICD-10-CM

## 2025-06-06 PROCEDURE — 99215 OFFICE O/P EST HI 40 MIN: CPT | Performed by: INTERNAL MEDICINE

## 2025-06-06 NOTE — PROGRESS NOTES
Subjective   Patient ID: [id number]  Radha Ellsworth is a 44 y.o. female who presents for No chief complaint on file..    Virtual or Telephone Consent    An interactive audio and video telecommunication system which permits real time communications between the patient (at the originating site) and provider (at the distant site) was utilized to provide this telehealth service.   Verbal consent was requested and obtained from Radha Ellsworth on this date, 06/06/25 for a telehealth visit and the patient's location was confirmed at the time of the visit.      HPI  PMH/PSH: asthma, COPD, T2DM, GERD, Asperger, anxiety, depression, PTSD  Social: Occasionally smokes cigarettes, no alcohol, + smokes marijuana. Currently unemployed, hotel restaurant management prior to that. Has 4 children.   FHx: + for DM     Recall:  R Thumb and wrist hurts for a couple of years and for that reason testing was done.  LOLA 1:80, neg FELICIANO   ESR normal.   RF and CCP neg     In 2023: LOLA neg, RF neg  XR hands normal  Lumbar DDD     Had carpal tunnel b/l and had L release in 2008. Was getting CSI for the CMC and wrists by Dr. Ribeiro, last one was ~ a year ago. CSI helped significantly.      R CMC and wrist pain is significantly worse with movement so she tries not to use her R wrist.   Ibuprofen ketorolac helps.   She is unsure if prednisone helps joints but overall feels better generally if she takes prednisone and it helps itching.     Occasionally gets migratory oligoarticular pain in elbows, MCPs>PIPs>DIPs, often. Self-resolves and then starts in a few other joints. Worse with activity and improves with rest.   Pain is associated with swelling.   No dactylitis.      She dealt with chronic low back pain worsening over past few months radiates down her R leg that improved and was found to have bulging lumbar disc.       3/2025:  She has hypopigmented rashes on her face  She reports getting q3 month CSI for CMC and wrist   After aguila she  reports having an episode of swelling of her L elbow.   MR 2/27: fragmented lunate with ON, moderate negative ulnar variance, no inflammatory arthropathy, benign cystic changes    Today both wrists and thumbs are bothersome, R thumb and R wrist are the wrist, cannot tie her shoes, use a can opener      Denies fever, weight loss, + tension headaches a few times last few months. + dry eyes and blurry vision. No redness or pain or photophobia. + dry mouth. No dental loss, loss of taste, nasal or oral ulcers, difficulty swallowing, + recurrent sinus and bronchitis infections. Denies chest pain. Denies shortness of breath, cough. Denies dysphagia, nausea, vomiting, heartburn, abdominal pain, constipation, diarrhea, melena or hematochezia. +sulfur burps. No recurrent urinary infections or STDs, no genital or anal ulcers. + Rash on L eyebrow. + photosensitivity. No psoriatic lesions, or alopecia. + numbness or tingling in both hands. Denies history of clots (arterial or venous). 2 first trimester miscarriages. 1 ectopic pregnancy. 4 c/s      Patient Active Problem List   Diagnosis    Abdominal pain    Abnormal findings on diagnostic imaging of liver and biliary tract    Abnormal weight gain    Bronchitis    Acute otitis media, right    Allergic reaction    Amenorrhea    Anesthesia of skin    Anxiety    Atopic dermatitis, unspecified    Bilateral acute serous otitis media    Cannabis dependence    Cardiomegaly    Bilateral carpal tunnel syndrome    Cough    Dermoid cyst    Diastolic hypertension    Diverticulitis    Edema, unspecified    Folliculitis    Hyperglycemia, unspecified    Hand pain    GERD (gastroesophageal reflux disease)    Left lower quadrant pain    Leukocytosis    Liver mass    Lobar pneumonia    Marijuana use, episodic    Nausea and vomiting    Nicotine dependence    Other fatigue    Other hyperlipidemia    Pain of right shoulder region    Paresthesia of upper extremity    Peritonitis    Conjunctivitis of  left eye    Rheumatoid arthritis, unspecified    Right flank pain    Shortness of breath    Tachycardia, unspecified    Thrombophlebitis of superficial veins of left lower extremity    Abrasion of eye region    Cyst of left upper eyelid    Depressive disorder    Drug-induced constipation    Infection of tooth    Obesity due to energy imbalance    Posttraumatic stress disorder    Otalgia of both ears    Positive antinuclear antibody       Past Medical History:   Diagnosis Date    Arthritis     Asperger's syndrome     COPD (chronic obstructive pulmonary disease) (Multi)     Tumor     left overy, benign    Unspecified asthma, uncomplicated ([facility]) 10/23/2017    Asthma       Past Surgical History:   Procedure Laterality Date     SECTION, CLASSIC  2014     Section    HAND SURGERY  2014    Hand Surgery                                                                                                                                                             Social History     Socioeconomic History    Marital status: Single     Spouse name: Not on file    Number of children: Not on file    Years of education: Not on file    Highest education level: Not on file   Occupational History    Not on file   Tobacco Use    Smoking status: Some Days     Types: Cigarettes    Smokeless tobacco: Never   Vaping Use    Vaping status: Never Used   Substance and Sexual Activity    Alcohol use: Never    Drug use: Yes     Types: Marijuana     Comment: every other day    Sexual activity: Not on file   Other Topics Concern    Not on file   Social History Narrative    Not on file     Social Drivers of Health     Financial Resource Strain: Not on File (2022)    Received from [employer]    Financial Resource Strain     Financial Resource Strain: 0   Recent Concern: Financial Resource Strain - At Risk (2022)    Received from [employer]    Financial Resource Strain     Financial Resource Strain: 2   Food Insecurity: Not  on File (2022)    Received from [facility]    Food Insecurity     Food: 0   Recent Concern: Food Insecurity - At Risk (2022)    Received from [employer]    Food Insecurity     Food: 2   Transportation Needs: Not on File (2022)    Received from [employer]    Transportation Needs     Transportation: 0   Physical Activity: Not on File (2021)    Received from [employer]    Physical Activity     Physical Activity: 0   Stress: Not on File (2022)    Received from [employer]    Stress     Stress: 0   Recent Concern: Stress - At Risk (2022)    Received from [employer]    Stress     Stress: 2   Social Connections: Not on File (2022)    Received from [employer]    Social Connections     Connectedness: 0   Intimate Partner Violence: Not on file   Housing Stability: Not on File (2022)    Received from [employer]    Housing Stability     Housin   Recent Concern: Housing Stability - At Risk (2022)    Received from [employer]    Housing Stability     Housin       Allergies   Allergen Reactions    Sulfa (Sulfonamide Antibiotics) Hives    Sulfamethoxazole-Trimethoprim Hives         Current Outpatient Medications:     albuterol 90 mcg/actuation inhaler, Inhale 2 puffs every 4 hours if needed for wheezing., Disp: 18 g, Rfl: 0    albuterol 90 mcg/actuation inhaler, Inhale 2 puffs every 4 hours if needed for wheezing., Disp: 18 g, Rfl: 0    albuterol 90 mcg/actuation inhaler, Inhale 1-2 puffs every 6 hours if needed for wheezing., Disp: 18 g, Rfl: 0    blood sugar diagnostic strip, 1 each twice a day., Disp: , Rfl:     clobetasol (Temovate) 0.05 % external solution, apply 1 APPLICATION to affected area twice a day, Disp: , Rfl:     Combivent Respimat  mcg/actuation inhaler, inhale 2 puffs as directed twice a day if needed for wheezing or shortness of breath, Disp: , Rfl:     cyclobenzaprine (Flexeril) 10 mg tablet, 1 tablet (10 mg)., Disp: , Rfl:     diclofenac sodium 3 % gel, Apply topically., Disp: , Rfl:     docusate  sodium (Colace) 100 mg capsule, 1 capsule (100 mg)., Disp: , Rfl:     famotidine (Pepcid) 20 mg tablet, Take 1 tablet (20 mg) by mouth 2 times a day for 15 days., Disp: 30 tablet, Rfl: 0    gabapentin (Neurontin) 100 mg capsule, Take 1 capsule (100 mg) by mouth every 8 hours. (Patient not taking: Reported on 3/7/2025), Disp: , Rfl:     ibuprofen 600 mg tablet, Take 1 tablet (600 mg) by mouth every 8 hours if needed for mild pain (1 - 3) or fever (temp greater than 38.0 C)., Disp: 30 tablet, Rfl: 0    ipratropium (Atrovent HFA) 17 mcg/actuation inhaler, Use 2 puffs daily., Disp: , Rfl:     ketorolac (Toradol) 10 mg tablet, 1 tablet (10 mg)., Disp: , Rfl:     lidocaine (Lidoderm) 5 % patch, Place 1 patch over 12 hours on the skin once daily. Remove & discard patch within 12 hours or as directed by MD., Disp: 7 patch, Rfl: 0    methocarbamol (Robaxin) 500 mg tablet, Take 1 tablet (500 mg) by mouth 3 times a day for 7 days. As needed for pain, Disp: 21 tablet, Rfl: 0    mometasone-formoterol (Dulera 50) 50-5 mcg/actuation HFA aerosol inhaler inhaler, Inhale 2 puffs twice a day., Disp: , Rfl:     naproxen-capsicum oleoresin 500 mg- 0.025 % kit, Take by mouth., Disp: , Rfl:     ondansetron ODT (Zofran-ODT) 4 mg disintegrating tablet, 1 tablet (4 mg)., Disp: , Rfl:     predniSONE (Deltasone) 10 mg tablet, Take 1 tablet (10 mg) by mouth. Take with food., Disp: , Rfl:     True Metrix Glucose Meter misc, as needed for blood glucose monitoring, Disp: , Rfl:     TRUEplus Lancets 28 gauge, , Disp: , Rfl:     Trulicity 0.75 mg/0.5 mL pen injector, 0.75 mg., Disp: , Rfl:     Objective     There were no vitals taken for this visit.    Physical Exam  Virtual         Lab Results   Component Value Date    WBC 16.7 (H) 01/15/2025    HGB 16.0 01/15/2025    HCT 48.8 (H) 01/15/2025    MCV 85 01/15/2025     01/15/2025       Chemistry    Lab Results   Component Value Date/Time     11/22/2024 1448    K 3.5 11/22/2024 1448    CL  103 11/22/2024 1448    CO2 26 11/22/2024 1448    BUN 14 11/22/2024 1448    CREATININE 0.82 11/22/2024 1448    Lab Results   Component Value Date/Time    CALCIUM 8.6 11/22/2024 1448    ALKPHOS 45 11/22/2024 1448    AST 16 11/22/2024 1448    ALT 21 11/22/2024 1448    BILITOT 0.3 11/22/2024 1448          Lab Results   Component Value Date    CRP 0.20 11/22/2024    LOLA Negative 11/22/2024    JO1 <0.2 11/22/2024    SPEP Normal. 11/22/2024    CALCIUM 8.6 11/22/2024     Alkaline Phosphatase   Date Value Ref Range Status   11/22/2024 45 33 - 110 U/L Final     AST   Date Value Ref Range Status   11/22/2024 16 9 - 39 U/L Final     Urea Nitrogen   Date Value Ref Range Status   11/22/2024 14 6 - 23 mg/dL Final     Sodium   Date Value Ref Range Status   11/22/2024 136 136 - 145 mmol/L Final     Potassium   Date Value Ref Range Status   11/22/2024 3.5 3.5 - 5.3 mmol/L Final     Bicarbonate   Date Value Ref Range Status   11/22/2024 26 21 - 32 mmol/L Final     ALT   Date Value Ref Range Status   11/22/2024 21 7 - 45 U/L Final     Comment:     Patients treated with Sulfasalazine may generate falsely decreased results for ALT.       MR hand right wo IV contrast  Narrative: Interpreted By:  Spenser Fajardo and Lawrence Austen   STUDY:  MRI of the  right hand without IV contrast;  4/9/2025 3:04 pm      INDICATION:  Signs/Symptoms:Severe CMC and wrist pain, evaluate for inflammatory  arthritis, patient reports inflammatory pain, XR hands unremarkable,  not improving with conservative measures. L hand imaging with  Osteonecrosis.      ,M25.50 Pain in unspecified joint,M19.049 Primary osteoarthritis,  unspecified hand      COMPARISON:  Radiographs 11/22/2024.      ACCESSION NUMBER(S):  [id number]      ORDERING CLINICIAN:  FRANCIS FOLEY      TECHNIQUE:  MR imaging of the  right hand was obtained  without administration of  intravenous contrast medium.      FINDINGS:  Tendons:  There is a small amount of fluid in the flexor carpi  radialis tendon  proximal to its insertion. The flexor and extensor tendons of the  hand are otherwise intact. The tendons of the thenar and hypothenar  compartments are normal.      Ligaments:  The major ligamentous structures of the hand are intact.      Joints:  The carpometacarpal, metacarpophalangeal, and interphalangeal joints  are unremarkable without evidence of cartilage loss or reactive  change.      Osseous structures:  There is STIR hyperintense and T1 hypointense signal of the trapezium  with mild deformity and cortical loss. The 1st CMC joint is subluxed  and there is severe joint space narrowing. There is negative ulnar  variance. No acute fracture.      Soft tissues:  There is no evidence of muscular atrophy or tear. There is 1st CMC  periarticular reactive edema and edema in the adductor pollicis  muscle. No suspicious soft tissue lesions are seen.      Impression: 1. Severe degenerative changes of the 1st carpometacarpal joint with  subluxation and abnormal marrow signal within the trapezium  concerning for avascular necrosis.  2. Negative ulnar variance which is better visualized on prior  radiographs.          I personally reviewed the images/study and I agree with the findings  as stated. This study was interpreted at [place], [place].      MACRO:  None      Signed by: Spenser Fajardo 4/9/2025 4:21 PM  Dictation workstation:   [id number]    MR hand left w and wo IV contrast  Narrative: Interpreted By:  Edith Rodriguez and Lawrence Austen   STUDY:  MRI of the  left hand with and without IV contrast;  2/27/2025 11:55  am      INDICATION:  Signs/Symptoms:evaluate lunate, fracture vs AVN, also evaluate for  inflammatory arthritis and for ulnar impingement.      ,M25.832 Other specified joint disorders, left wrist      COMPARISON:  Radiographs 11/22/2024      ACCESSION NUMBER(S):  [id number]      ORDERING CLINICIAN:  FRANCIS FOLEY       TECHNIQUE:  MR imaging of the  left hand was obtained  with and without  administration of intravenous contrast medium.      FINDINGS:  Suboptimal secondary to motion artifact.      Tendons:  The flexor and extensor tendons of the hand are intact. The tendons  of the thenar and hypothenar compartments are normal.      Ligaments:  Presumed tear of the scapholunate ligament given nonvisualization of  the lunate. There appears to be tear of the lamina of the triangular  fibrocartilage near the ulnar styloid attachment with irregularity.      Joints:      Joint spaces of the metacarpophalangeal and interphalangeal joints  are maintained. No synovitis, periarticular edema or abnormal  enhancement. There is small focal cystic structure noted in the  posterior aspect of the 1st CMC joint compatible with a benign  ganglion cyst.      Osseous structures:  Redemonstrated severe fragmentation of the lunate with near  nonvisualization consistent with chronic osteonecrosis and marked  fragmentation. Mild reactive marrow edema and enhancement in the  scaphoid and capitate. Prominent cyst is noted in the proximal aspect  of the capitate. There is proximal migration of the capitate  secondary to collapsed lunate. There is a small benign cyst in the  proximal 4th metacarpal. Redemonstrated negative ulnar variance.      Soft tissues:  8 mm ganglion cyst at the dorsal 1st carpometacarpal joint.  No suspicious soft tissue lesions are seen.      Impression: 1. Redemonstrated findings of markedly fragmented lunate with  osteonecrosis with associated proximal migration of the capitate.  2. Redemonstrated moderate negative ulnar variance.  3. No findings of inflammatory arthropathy involving the  articulations of the hand.  4. Incidental findings as described above that include benign cystic  changes of the proximal capitate and proximal 4th metacarpal as well  as small ganglion cyst in the dorsal aspect of the 1st CMC joint.          I  personally reviewed the images/study and I agree with the findings  as stated. This study was interpreted at [facility], [place], [place].      MACRO:  None      Signed by: Edith Rodriguez 2/27/2025 3:05 PM  Dictation workstation:   [id number]    CT LS 04/2024  FINDINGS:   The alignment is anatomic.  There is no fracture or traumatic  subluxation.  The vertebral body heights are well maintained.  At L3-4 there is intervertebral disc space narrowing.  There is a disc  bulge primarily to the left.  This does result in narrowing of the  left neural foramina.  The paravertebral soft tissues are within normal limits.  The  visualized abdomen is unremarkable.  IMPRESSION:  No evidence of acute fracture or subluxation.  Degenerative changes the lumbosacral spine at L3-4 with broad-based  disc bulge most pronounced on the left with narrowing of the left  neural foramina.     CTAP 02/06/2024      IMPRESSION:  Small hiatal hernia.      There are 3 hypodense liver lesions identified which do not have the  appearance of cysts. The possibility of hepatic hemangiomas is not  excluded. A nonemergent MRI of the liver could be performed for  further evaluation.      5.2 x 8.7 x 9.2 cm left adnexal mass containing adipose tissue most  consistent with large left ovarian dermoid/teratoma. The right ovary  is prominent containing a number of follicles.      Physiologic amount of free fluid in the pelvic cul-de-sac.      2.2 x 3.2 x 3.2 cm soft tissue mass in the left inguinal region with  additional soft tissue mass in the midline along the inferior aspect  of the linea alba. This particular soft tissue mass in the midline  was evaluated on ultrasound from 05/10/2016 and has not changed  significantly in size. Differential diagnosis would include  postoperative scarring/fibrosis/desmoid tumors or perhaps even  endometriosis.     XR hand 08/2023  RESULT:   There is negative ulnar variance.  No acute  fracture or malalignment.    Degenerative changes at the first CMC joint.  Calcification in the region   of the triangular fibrocartilage.        Assessment/Plan   First visit was for evaluation of polyarthralgias, description is mixture of mechanical and inflammatory pains.   Has TTP that is not diffuse and is localized to few MCPs, PIPs, and wrists. She has some swelling of R wrist and R CMC.   She also has lumbar DDD    Labs 11/22/2024 reviewed : leukocytosis, LOLA and FELICIANO neg, normal complements, normal inflammatory markers, bland UA, RF, CCP neg, normal renal function.      XR hand reviewed with osteonecrosis of left lunate, severe 1st CMC OA and evidence of ulnar impingement  MR L hand 2/27/2025 reviewed with markedly fragmented lunate with ON, no findings of inflammatory arthritis     Today MRI R hand reviewed with again severe OA of 1st CMC with subluxation and AVN, neg ulnar variance.      Also reports a photosensitive rash on her face and extremities and chest that leaves hypopigmented scars, malar erythema that doesn't spare the nasolabial fold.   Is reporting some sicca symptoms (dry eyes and dry mouth) and blurry vision.      On revieweof prior labs:   No cytopenias, normal albumin, UA bland, LOLA 1:80, neg FELICIANO   ESR normal.   RF and CCP neg     In 2023: LOLA neg, RF neg  XR hands normal     Impression:    No evidence of inflammatory arthritis on in person exam or on MRI hands.   Rash?  Osteonecrosis RF: prednisone, intra-articular CSI, smoking vs underlying SLE? Has a previously positive LOLA once in her lifetime but no other clear SLE symptomatology so no diagnosis yet.     - will refer to ortho for b/l ON and ulnar impingement syndrome.   - will refer to dermatology for evaluation of hypopigmentation, photosensitivity, rash and will see her after that appointment.     RTC in October      Karl Acevedo MD  Division of Rheumatology   [facility]

## 2025-06-16 ENCOUNTER — APPOINTMENT (OUTPATIENT)
Dept: ORTHOPEDIC SURGERY | Facility: CLINIC | Age: 45
End: 2025-06-16
Payer: COMMERCIAL

## 2025-06-16 DIAGNOSIS — M79.641 BILATERAL HAND PAIN: ICD-10-CM

## 2025-06-16 DIAGNOSIS — M25.531 RIGHT WRIST PAIN: ICD-10-CM

## 2025-06-16 DIAGNOSIS — M79.642 BILATERAL HAND PAIN: ICD-10-CM

## 2025-08-28 ENCOUNTER — APPOINTMENT (OUTPATIENT)
Dept: RADIOLOGY | Facility: HOSPITAL | Age: 45
End: 2025-08-28
Payer: COMMERCIAL

## 2025-08-28 ENCOUNTER — HOSPITAL ENCOUNTER (EMERGENCY)
Facility: HOSPITAL | Age: 45
Discharge: HOME | End: 2025-08-28
Attending: EMERGENCY MEDICINE
Payer: COMMERCIAL

## 2025-08-28 PROCEDURE — 74176 CT ABD & PELVIS W/O CONTRAST: CPT

## 2025-08-28 ASSESSMENT — PAIN DESCRIPTION - PAIN TYPE
TYPE: CHRONIC PAIN
TYPE: CHRONIC PAIN

## 2025-08-28 ASSESSMENT — PAIN - FUNCTIONAL ASSESSMENT
PAIN_FUNCTIONAL_ASSESSMENT: 0-10
PAIN_FUNCTIONAL_ASSESSMENT: 0-10

## 2025-08-28 ASSESSMENT — PAIN DESCRIPTION - LOCATION
LOCATION: BACK
LOCATION: BACK

## 2025-08-28 ASSESSMENT — PAIN DESCRIPTION - ONSET: ONSET: ONGOING

## 2025-08-28 ASSESSMENT — PAIN SCALES - GENERAL
PAINLEVEL_OUTOF10: 0 - NO PAIN
PAINLEVEL_OUTOF10: 10 - WORST POSSIBLE PAIN
PAINLEVEL_OUTOF10: 10 - WORST POSSIBLE PAIN

## 2025-08-28 ASSESSMENT — PAIN DESCRIPTION - DESCRIPTORS
DESCRIPTORS: ACHING
DESCRIPTORS: ACHING

## 2025-08-28 ASSESSMENT — PAIN DESCRIPTION - PROGRESSION: CLINICAL_PROGRESSION: NOT CHANGED

## 2025-09-02 ENCOUNTER — RESULTS FOLLOW-UP (OUTPATIENT)
Dept: PHARMACY | Facility: HOSPITAL | Age: 45
End: 2025-09-02
Payer: COMMERCIAL

## 2025-09-23 ENCOUNTER — APPOINTMENT (OUTPATIENT)
Dept: DERMATOLOGY | Facility: CLINIC | Age: 45
End: 2025-09-23
Payer: COMMERCIAL

## 2025-10-08 ENCOUNTER — APPOINTMENT (OUTPATIENT)
Facility: CLINIC | Age: 45
End: 2025-10-08
Payer: COMMERCIAL

## 2025-10-15 ENCOUNTER — APPOINTMENT (OUTPATIENT)
Facility: CLINIC | Age: 45
End: 2025-10-15
Payer: COMMERCIAL